# Patient Record
Sex: MALE | Race: WHITE | NOT HISPANIC OR LATINO | Employment: FULL TIME | URBAN - METROPOLITAN AREA
[De-identification: names, ages, dates, MRNs, and addresses within clinical notes are randomized per-mention and may not be internally consistent; named-entity substitution may affect disease eponyms.]

---

## 2019-07-10 ENCOUNTER — HOSPITAL ENCOUNTER (EMERGENCY)
Facility: HOSPITAL | Age: 29
Discharge: HOME | End: 2019-07-10
Attending: EMERGENCY MEDICINE | Admitting: EMERGENCY MEDICINE
Payer: COMMERCIAL

## 2019-07-10 VITALS
HEIGHT: 72 IN | WEIGHT: 200 LBS | SYSTOLIC BLOOD PRESSURE: 157 MMHG | RESPIRATION RATE: 18 BRPM | DIASTOLIC BLOOD PRESSURE: 81 MMHG | BODY MASS INDEX: 27.09 KG/M2 | HEART RATE: 98 BPM | TEMPERATURE: 98.3 F | OXYGEN SATURATION: 95 %

## 2019-07-10 DIAGNOSIS — M54.50 LOW BACK PAIN: Primary | ICD-10-CM

## 2019-07-10 PROCEDURE — 96374 THER/PROPH/DIAG INJ IV PUSH: CPT

## 2019-07-10 PROCEDURE — 99282 EMERGENCY DEPT VISIT SF MDM: CPT

## 2019-07-10 PROCEDURE — 96372 THER/PROPH/DIAG INJ SC/IM: CPT

## 2019-07-10 PROCEDURE — 99284 EMERGENCY DEPT VISIT MOD MDM: CPT | Performed by: EMERGENCY MEDICINE

## 2019-07-10 RX ORDER — SENNOSIDES 8.6 MG
650 CAPSULE ORAL EVERY 8 HOURS PRN
Qty: 30 TABLET | Refills: 0 | Status: SHIPPED | OUTPATIENT
Start: 2019-07-10 | End: 2020-12-28

## 2019-07-10 RX ORDER — IBUPROFEN 600 MG/1
600 TABLET ORAL EVERY 6 HOURS PRN
Qty: 28 TABLET | Refills: 0 | Status: SHIPPED | OUTPATIENT
Start: 2019-07-10 | End: 2019-07-17

## 2019-07-10 RX ORDER — DIAZEPAM 5 MG/1
5 TABLET ORAL
Qty: 4 TABLET | Refills: 0 | Status: SHIPPED | OUTPATIENT
Start: 2019-07-10 | End: 2020-12-28

## 2019-07-10 RX ORDER — DIAZEPAM 5 MG/ML
5 INJECTION, SOLUTION INTRAMUSCULAR; INTRAVENOUS ONCE
Status: COMPLETED | OUTPATIENT
Start: 2019-07-10 | End: 2019-07-10

## 2019-07-10 RX ORDER — KETOROLAC TROMETHAMINE 30 MG/ML
15 INJECTION, SOLUTION INTRAMUSCULAR; INTRAVENOUS ONCE
Status: COMPLETED | OUTPATIENT
Start: 2019-07-10 | End: 2019-07-10

## 2019-07-10 RX ADMIN — KETOROLAC TROMETHAMINE 15 MG: 30 INJECTION, SOLUTION INTRAMUSCULAR at 09:52

## 2019-07-10 RX ADMIN — Medication 5 MG: at 09:53

## 2019-07-10 NOTE — DISCHARGE INSTRUCTIONS
Return to ER if having worsening pain, lose bowel or bladder control, weakness or feel worse overall

## 2019-07-10 NOTE — ED PROVIDER NOTES
History  Chief Complaint   Patient presents with    Back Pain     pt states has low back pain radiating down L leg       History provided by:  Patient   used: No    Back Pain   Associated symptoms: no abdominal pain, no chest pain, no dysuria, no fever, no headaches and no weakness      Patient is a 72-year-old male presenting to emergency department lower back pain  Left-sided  Radiates to the leg  Has had this in the past   Used to see a chiropractor  Was doing better  Since over the weekend has been getting worse  No weakness  Has not lost bowel or bladder control  No paresthesias the groin region  No trauma  No falls  Has not had an MRI  Took ibuprofen at home with minimal relief  Pain limiting motion  No weakness  MDM sciatica versus spasm, will give Valium, Toradol, re-evaluate    None       History reviewed  No pertinent past medical history  History reviewed  No pertinent surgical history  History reviewed  No pertinent family history  I have reviewed and agree with the history as documented  Social History     Tobacco Use    Smoking status: Current Every Day Smoker    Smokeless tobacco: Never Used   Substance Use Topics    Alcohol use: Yes    Drug use: Yes     Types: Marijuana        Review of Systems   Constitutional: Negative for chills, diaphoresis and fever  Respiratory: Negative for cough, shortness of breath, wheezing and stridor  Cardiovascular: Negative for chest pain, palpitations and leg swelling  Gastrointestinal: Negative for abdominal pain, blood in stool, diarrhea, nausea and vomiting  Genitourinary: Negative for dysuria, frequency and urgency  Musculoskeletal: Positive for back pain  Negative for neck stiffness  Skin: Negative for pallor and rash  Neurological: Negative for dizziness, syncope, weakness, light-headedness and headaches  All other systems reviewed and are negative        Physical Exam  Physical Exam Constitutional: He is oriented to person, place, and time  He appears well-developed and well-nourished  HENT:   Head: Normocephalic and atraumatic  Eyes: Pupils are equal, round, and reactive to light  Neck: Neck supple  Cardiovascular: Normal rate, regular rhythm, normal heart sounds and intact distal pulses  Pulmonary/Chest: Effort normal and breath sounds normal  No respiratory distress  Abdominal: Soft  Bowel sounds are normal  There is no tenderness  Musculoskeletal: Normal range of motion  He exhibits no edema, tenderness or deformity  Neurological: He is alert and oriented to person, place, and time  Skin: Skin is warm and dry  Capillary refill takes less than 2 seconds  No rash noted  No erythema  No pallor  Vitals reviewed  Vital Signs  ED Triage Vitals [07/10/19 0835]   Temperature Pulse Respirations Blood Pressure SpO2   98 3 °F (36 8 °C) 98 18 157/81 95 %      Temp Source Heart Rate Source Patient Position - Orthostatic VS BP Location FiO2 (%)   Oral -- -- -- --      Pain Score       Worst Possible Pain           Vitals:    07/10/19 0835   BP: 157/81   Pulse: 98         Visual Acuity      ED Medications  Medications   ketorolac (TORADOL) injection 15 mg (15 mg Intramuscular Given 7/10/19 0227)   diazepam (VALIUM) injection 5 mg (5 mg Intravenous Given 7/10/19 8806)       Diagnostic Studies  Results Reviewed     None                 No orders to display              Procedures  Procedures       ED Course                               MDM    Disposition  Final diagnoses:   Low back pain     Time reflects when diagnosis was documented in both MDM as applicable and the Disposition within this note     Time User Action Codes Description Comment    7/10/2019 10:26 AM Jo Ann Mckenna [M54 5] Low back pain       ED Disposition     ED Disposition Condition Date/Time Comment    Discharge Stable Wed Jul 10, 2019 10:26 AM Jamal Montano discharge to home/self care  Follow-up Information     Follow up With Specialties Details Why Contact Info Additional Information    Army Celi MD Internal Medicine In 2 days Re-evaluation [address] Emergency Department Emergency Medicine  As needed, If symptoms worsen [address]  [phone] [facility], [address], [place], [place], [address]          Discharge Medication List as of 7/10/2019 10:31 AM      START taking these medications    Details   acetaminophen (TYLENOL) 650 mg CR tablet Take 1 tablet (650 mg total) by mouth every 8 (eight) hours as needed for mild pain, Starting Wed 7/10/2019, Print      diazepam (VALIUM) 5 mg tablet Take 1 tablet (5 mg total) by mouth daily at bedtime as needed for muscle spasms for up to 4 days, Starting Wed 7/10/2019, Until Sun 7/14/2019, Print      ibuprofen (MOTRIN) 600 mg tablet Take 1 tablet (600 mg total) by mouth every 6 (six) hours as needed for mild pain for up to 7 days, Starting Wed 7/10/2019, Until Wed 7/17/2019, Print               ED Provider  Electronically Signed by           Ruben Reaves MD  07/10/19 9625

## 2019-07-11 ENCOUNTER — TRANSCRIBE ORDERS (OUTPATIENT)
Dept: PHYSICAL THERAPY | Facility: CLINIC | Age: 29
End: 2019-07-11

## 2019-07-11 ENCOUNTER — AMB VIDEO VISIT (OUTPATIENT)
Dept: URGENT CARE | Facility: CLINIC | Age: 29
End: 2019-07-11

## 2019-07-11 ENCOUNTER — EVALUATION (OUTPATIENT)
Dept: PHYSICAL THERAPY | Facility: CLINIC | Age: 29
End: 2019-07-11
Payer: COMMERCIAL

## 2019-07-11 ENCOUNTER — NURSE TRIAGE (OUTPATIENT)
Dept: PHYSICAL THERAPY | Facility: OTHER | Age: 29
End: 2019-07-11

## 2019-07-11 VITALS — TEMPERATURE: 98.2 F | SYSTOLIC BLOOD PRESSURE: 145 MMHG | DIASTOLIC BLOOD PRESSURE: 80 MMHG | HEART RATE: 88 BPM

## 2019-07-11 DIAGNOSIS — M54.42 ACUTE LEFT-SIDED LOW BACK PAIN WITH LEFT-SIDED SCIATICA: Primary | ICD-10-CM

## 2019-07-11 DIAGNOSIS — M54.42 ACUTE LEFT-SIDED LOW BACK PAIN WITH LEFT-SIDED SCIATICA: ICD-10-CM

## 2019-07-11 PROCEDURE — EVISIT: Performed by: PHYSICIAN ASSISTANT

## 2019-07-11 PROCEDURE — 97162 PT EVAL MOD COMPLEX 30 MIN: CPT | Performed by: PHYSICAL THERAPIST

## 2019-07-11 RX ORDER — METHYLPREDNISOLONE 4 MG/1
TABLET ORAL
Qty: 21 TABLET | Refills: 0 | Status: SHIPPED | OUTPATIENT
Start: 2019-07-11 | End: 2019-07-17

## 2019-07-11 NOTE — TELEPHONE ENCOUNTER
Background - Initial Assessment  Clinical complaint: Acute low back left side, radiates down the left leg  No known injury  Pt has seen a Chiropractor  Pt has had this pain for the past 2 months, did have relief for about 2-3 weeks and pain returned  Pt is taking Valium, Ibuprofen, Tylenol, and is using ice  Date of onset: 2 months  Mechanism of injury:  See above    Previous Treatment - Previous Treatment  Previous evaluation: Chiropractor  Current provider: PCP  Diagnostics: x-rays  Previous treatment: Physical therapy for his leg  Medications, Ice    Protocols used:  AMB COMPREHENSIVE SPINE PROGRAM PROTOCOL    This nurse did review in detail the comp spine program and what we can provide for the pt for their back pain  Pt is agreeable to being triaged by this nurse and would like to have physical therapy  Referrals were placed to the following:  Physical Therapy at the [place] site  Pt was transferred to  to make evaluation appointment    No further questions voiced at this time and Pt did state understanding of the referral that was placed

## 2019-07-11 NOTE — PROGRESS NOTES
PT Evaluation     Today's date: 2019  Patient name: Maurizio Loera  : 1990  MRN: [id number]  Referring provider: Leana Ramirez MD  Dx:   Encounter Diagnosis     ICD-10-CM    1  Acute left-sided low back pain with left-sided sciatica M54 42 Ambulatory referral to PT spine       Start Time: 1355  Stop Time: 1520  Total time in clinic (min): 85 minutes    Assessment  Assessment details: Patient is a 34 y o  male who presents to [address] with the above listed impairments  Patients fits into a specific exercise treatment category due to probable lumbar derangement and would benefit from skilled PT services to address these impairments and to maximize function  The patient with extremely high irritability and an inability to sit or complete a supine position due to the pain  Patient with signs and symptoms of high inflammation along with a right lateral shift  Tele-med conferencing was used during the visit due to the patient's extreme irritability, high pain, and inability to complete any functional tasks  During the tele-med conference, the PA prescribed the patient a medrol dose pack to help with his pain and inflammation  The patient should benefit from shift correction and lumbar extension once his pain is under control  Impairments: abnormal muscle firing, abnormal or restricted ROM, activity intolerance, impaired physical strength, lacks appropriate home exercise program, pain with function, poor posture  and poor body mechanics  Understanding of Dx/Px/POC: good   Prognosis: good    Goals  Impairment Goals  - Decrease pain by 50% in 4 weeks    - Increase lumbar flexion to full in 4 weeks  - Increase lumbar extension to full in 4 weeks    Functional Goals  - Return to Prior Level of Function in 4 weeks  - Patient will be independent with HEP in 4 weeks  - Patient will ambulate without an AD and without compensations in 4 weeks      Plan  Patient would benefit from: skilled physical therapy  Planned modality interventions: cryotherapy, thermotherapy: hydrocollator packs and TENS  Planned therapy interventions: home exercise program, graded exercise, functional ROM exercises, flexibility, body mechanics training, postural training, patient education, therapeutic activities, therapeutic exercise, manual therapy, joint mobilization and neuromuscular re-education  Frequency: 2x week  Duration in weeks: 4  Treatment plan discussed with: patient and PCP        Subjective Evaluation    History of Present Illness  Mechanism of injury: HPI:  Back pain started 2 months ago and was seeing a chiropractor  He noted his pain started to go away and he was able to go away on vacation  He then felt increased pain on  and had to leave work early  He went to the ER 7/10 and was prescribed Tylenol, ibuprofen, and Valium  Patient reports seeing his PCP where x-rays were done on his back and were (-) for any bony pathology  Pain Location:  Left sided low back pain, radiating into the L foot  Occupation:  [employer]   Prior Functional Limitations: Independent prior   AGG:  Walking, bending, sitting, sleeping  Ease:  No easing factors  Patient Goals:  "I want to be able to walk and get back to work"    Pain  Current pain ratin  At best pain ratin  At worst pain rating: 10  Quality: needle-like and dull ache          Objective     Concurrent Complaints  Positive for night pain and disturbed sleep   Negative for bladder dysfunction, bowel dysfunction and saddle (S4) numbness    Additional Special Questions  Unexplained Weight Loss:  NO  Fever:  NO  Urine Retention:  NO  Acute Drop Foot or Paralysis:  NO  Major Trauma (fall from height, high speed collision, direct blow to spine):  NO              If yes; does patient have nausea, lightheadedness, or loss of                            consciousness:        Tenderness     Additional Tenderness Details  No tenderness to palpation    Neurological Testing     Sensation     Lumbar   Left   Intact: light touch  Hyposensation: light touch    Right   Intact: light touch    Reflexes   Left   Patellar (L4): normal (2+)  Achilles (S1): normal (2+)    Right   Patellar (L4): normal (2+)  Achilles (S1): normal (2+)    Additional Neurological Details  Patient unable to tolerate any sitting or supine position  Barely able to sit for reflexes  Patient able to lie prone for 20 minutes    Active Range of Motion     Additional Active Range of Motion Details  Lumbar Extension 25% w/ pain  Lumbar Flexion 0% w/ pain    Strength/Myotome Testing     Additional Strength Details  Unable to fully assess MMTs due to patient's inability to tolerate prone, side lying, or supine  Tests     Additional Tests Details  Patient unable to tolerate any supine positioning, leading to inability to complete special testing  Patient with a right lateral shift evident upon standing  Patient standing and ambulating with bilateral axillary crutches to help un-weight the left lower extremity  Patient with increased pain duirng shift correction  Patient reported 10/10 pain during session  Tele-med was used due to patient's high irritability, with the thought of a medrol dose back to help with inflammation which was prescribed by the PA  General Comments:      Lumbar Comments  Vitals: please see vitals section  BP sitting LUE: please see vitals section  HR: please see vitals section  SaO2: please see vitals section

## 2019-07-11 NOTE — CARE ANYWHERE EVISITS
Visit Summary for Alexandre Reno - Gender: Male - Date of Birth: [id number]  Date: [id number] - Duration: 6 minutes  Patient: Alexandre Reno  Provider: Enrique Car PA-C    Patient Contact Information  Address  [place]; [address] [place]  [phone]    Visit Topics  Back pain [Added By: Self - 2019-07-11]    Conversation Transcripts  [0A][0A] [Notification] Mary Ann Garcia,  Practice Adm, will help you prepare for your visit  She is assisting Enrique Car PA-C, Urgent Care Specialist [0A][Leticia Grover] tHANK YOU FOR CHOOSING eVISITS[0A][Leticia Grover] tHE PROVIDER WILL BE WITH YOU   Naya Moss Thank you[0A][Notification] Mary Ann Garcia has left the room  [0A][WANG COYNE] Reid Salazar this is Payton Holder a PT for [facility]  I have Ashleyberg here through the comprehensive spine program and we just wanted to   make sure we were still connected[0A][Notification] Mary Ann Garcia,  Practice Adm, will help you prepare for your visit  She is assisting Enrique Car PA-C, Urgent Care Specialist [0A][Leticia Grover] Yes you are, I just sent an email to the   provider[0A][Leticia Smith for the wait today  [0A][Leticia Grover] If I transfer to someone else it may be an additional wait, so I am waiting to hear from the [address] [place] [address] [0A][WANG COYNE] Thanks*[0A][Notification] [facility] has left the room  [0A][Notification] You are connected with Enrique Car PA-C, Urgent Care Specialist [0A][Notification] Alexandre Reno is located in [place]  [0A][Notification] Alexandre Reno has shared health history  Guy Earl  [0A]    Diagnosis    Procedures  Value: [id number] Code: CPT-4 UNLISTED E&M SERVICE    Medications Prescribed    No prescriptions ordered    Electronically signed by: Jessica Barrientos(NPI [id number])

## 2019-07-11 NOTE — PROGRESS NOTES
TeleConsultation - Emily Gallagher 34 y o  male MRN: [id number]     Encounter: [id number]    REQUIRED DOCUMENTATION:     1  This service was provided via Telemedicine  2  Provider located at [facility]  3  TeleMed provider: Ilsa Kelly PA-C   4  Identify all parties in room with patient during tele consult:  Patient and PT, George Mcclelland  5  After connecting through televideo, patient was identified by name and date of birth and confirmed patient was located in [place]  Patient was then informed that this was a Telemedicine visit and that the exam was being conducted confidentially over secure lines  My office door was closed  No one else was in the room  Patient acknowledged consent and understanding of privacy and security of the Telemedicine visit  Patient presented the opportunity for them to ask any questions regarding the visit today  The patient agreed to participate  [address]  (office) [phone]  (fax) [phone]        NAME: Emily Gallagher is a 34 y o  male  : 1990    MRN: [id number]  DATE: 2019  TIME: 3:27 PM    Assessment and Plan   Acute left-sided low back pain with left-sided sciatica [M54 42]  1  Acute left-sided low back pain with left-sided sciatica  methylprednisolone (MEDROL) 4 mg tablet       Patient Instructions   Medrol pack as instructed  To follow up with comprehensive pain specialist and PT  To present to the ER if symptoms worsen  Chief Complaint     Chief Complaint   Patient presents with    Back Pain         History of Present Illness   Emily Gallagher presents to the video visit c/o    Back Pain   This is a recurrent problem  The current episode started more than 1 month ago  The problem occurs constantly (worsening over past week)  The problem has been gradually worsening since onset  The pain is present in the lumbar spine (L)  The quality of the pain is described as aching and cramping  Radiates to: posterior left leg  The pain is moderate  The symptoms are aggravated by bending, sitting and position  Associated symptoms include leg pain, numbness and tingling  Pertinent negatives include no abdominal pain, bladder incontinence, bowel incontinence, chest pain, dysuria, fever, headaches, paresis, paresthesias, pelvic pain or perianal numbness  He has tried NSAIDs and muscle relaxant for the symptoms  The treatment provided no relief  Review of Systems   Review of Systems   Constitutional: Negative for fever  Cardiovascular: Negative for chest pain  Gastrointestinal: Negative for abdominal pain and bowel incontinence  Genitourinary: Negative for bladder incontinence, dysuria and pelvic pain  Musculoskeletal: Positive for back pain  Neurological: Positive for tingling and numbness  Negative for headaches and paresthesias  Current Medications     Long-Term Medications   Medication Sig Dispense Refill    diazepam (VALIUM) 5 mg tablet Take 1 tablet (5 mg total) by mouth daily at bedtime as needed for muscle spasms for up to 4 days 4 tablet 0    ibuprofen (MOTRIN) 600 mg tablet Take 1 tablet (600 mg total) by mouth every 6 (six) hours as needed for mild pain for up to 7 days 28 tablet 0       Current Allergies     Allergies as of 07/11/2019    (No Known Allergies)            The following portions of the patient's history were reviewed and updated as appropriate: allergies, current medications, past family history, past medical history, past social history, past surgical history and problem list   History reviewed  No pertinent past medical history  History reviewed  No pertinent surgical history    Social History     Socioeconomic History    Marital status: Single     Spouse name: Not on file    Number of children: Not on file    Years of education: Not on file    Highest education level: Not on file   Occupational History    Not on file   Social Needs    Financial resource strain: Not on file    Food insecurity:     Worry: Not on file     Inability: Not on file    Transportation needs:     Medical: Not on file     Non-medical: Not on file   Tobacco Use    Smoking status: Current Every Day Smoker    Smokeless tobacco: Never Used   Substance and Sexual Activity    Alcohol use:  Yes    Drug use: Yes     Types: Marijuana    Sexual activity: Not on file   Lifestyle    Physical activity:     Days per week: Not on file     Minutes per session: Not on file    Stress: Not on file   Relationships    Social connections:     Talks on phone: Not on file     Gets together: Not on file     Attends Zoroastrianism service: Not on file     Active member of club or organization: Not on file     Attends meetings of clubs or organizations: Not on file     Relationship status: Not on file    Intimate partner violence:     Fear of current or ex partner: Not on file     Emotionally abused: Not on file     Physically abused: Not on file     Forced sexual activity: Not on file   Other Topics Concern    Not on file   Social History Narrative    Not on file       Objective   There were no vitals taken for this visit    video visit  Physical Exam     Physical Exam  Video visit- per PTOrlin, patient standing due to pain, using crutches, lumbar spine tenderness L, unable to complete SLR test due to severity of pain per Mellissa Multani PA-C

## 2019-07-11 NOTE — LETTER
2019    MD Angelita Bell Cha [address] [facility]    Patient: Kaley Rader  YOB: 1990   Date of Visit: 2019      Dear Dr Ovidio Dominguez: One of your patients, Kaley Rader, was referred to me by the [facility] program   Please see the evaluation summary attached below  If care is required beyond 30 days to help your patient reach their goals, I will send you a request for signature on the plan of care  If you have any questions or concerns, please don't hesitate to call  Sincerely,    Donald Collins PT      Primary Care Provider:      I certify that I have read the below Plan of Care and certify the need for these services furnished under this plan of treatment while under my care  MD Angelita Bell Cha [address]: [phone]           PT Evaluation     Today's date: 2019  Patient name: Kaley Rader  : 1990  MRN: [id number]  Referring provider: Harry Merlos MD  Dx:   Encounter Diagnosis     ICD-10-CM    1  Acute left-sided low back pain with left-sided sciatica M54 42 Ambulatory referral to PT spine       Start Time: 1355  Stop Time: 1520  Total time in clinic (min): 85 minutes    Assessment  Assessment details: Patient is a 34 y o  male who presents to [place] [address] program with the above listed impairments  Patients fits into a specific exercise treatment category due to probable lumbar derangement and would benefit from skilled PT services to address these impairments and to maximize function  The patient with extremely high irritability and an inability to sit or complete a supine position due to the pain  Patient with signs and symptoms of high inflammation along with a right lateral shift  Tele-med conferencing was used during the visit due to the patient's extreme irritability, high pain, and inability to complete any functional tasks  During the tele-med conference, the PA prescribed the patient a medrol dose pack to help with his pain and inflammation  The patient should benefit from shift correction and lumbar extension once his pain is under control  Impairments: abnormal muscle firing, abnormal or restricted ROM, activity intolerance, impaired physical strength, lacks appropriate home exercise program, pain with function, poor posture  and poor body mechanics  Understanding of Dx/Px/POC: good   Prognosis: good    Goals  Impairment Goals  - Decrease pain by 50% in 4 weeks  - Increase lumbar flexion to full in 4 weeks  - Increase lumbar extension to full in 4 weeks    Functional Goals  - Return to Prior Level of Function in 4 weeks  - Patient will be independent with HEP in 4 weeks  - Patient will ambulate without an AD and without compensations in 4 weeks      Plan  Patient would benefit from: skilled physical therapy  Planned modality interventions: cryotherapy, thermotherapy: hydrocollator packs and TENS  Planned therapy interventions: home exercise program, graded exercise, functional ROM exercises, flexibility, body mechanics training, postural training, patient education, therapeutic activities, therapeutic exercise, manual therapy, joint mobilization and neuromuscular re-education  Frequency: 2x week  Duration in weeks: 4  Treatment plan discussed with: patient and PCP        Subjective Evaluation    History of Present Illness  Mechanism of injury: HPI:  Back pain started 2 months ago and was seeing a chiropractor  He noted his pain started to go away and he was able to go away on vacation  He then felt increased pain on Tuesday 7/9 and had to leave work early  He went to the ER 7/10 and was prescribed Tylenol, ibuprofen, and Valium  Patient reports seeing his PCP where x-rays were done on his back and were (-) for any bony pathology          Pain Location:  Left sided low back pain, radiating into the L foot  Occupation: [employer]   Prior Functional Limitations: Independent prior   AGG:  Walking, bending, sitting, sleeping  Ease:  No easing factors  Patient Goals:  "I want to be able to walk and get back to work"    Pain  Current pain ratin  At best pain ratin  At worst pain rating: 10  Quality: needle-like and dull ache          Objective     Concurrent Complaints  Positive for night pain and disturbed sleep  Negative for bladder dysfunction, bowel dysfunction and saddle (S4) numbness    Additional Special Questions  Unexplained Weight Loss:  NO  Fever:  NO  Urine Retention:  NO  Acute Drop Foot or Paralysis:  NO  Major Trauma (fall from height, high speed collision, direct blow to spine):  NO              If yes; does patient have nausea, lightheadedness, or loss of                            consciousness:        Tenderness     Additional Tenderness Details  No tenderness to palpation    Neurological Testing     Sensation     Lumbar   Left   Intact: light touch  Hyposensation: light touch    Right   Intact: light touch    Reflexes   Left   Patellar (L4): normal (2+)  Achilles (S1): normal (2+)    Right   Patellar (L4): normal (2+)  Achilles (S1): normal (2+)    Additional Neurological Details  Patient unable to tolerate any sitting or supine position  Barely able to sit for reflexes  Patient able to lie prone for 20 minutes    Active Range of Motion     Additional Active Range of Motion Details  Lumbar Extension 25% w/ pain  Lumbar Flexion 0% w/ pain    Strength/Myotome Testing     Additional Strength Details  Unable to fully assess MMTs due to patient's inability to tolerate prone, side lying, or supine  Tests     Additional Tests Details  Patient unable to tolerate any supine positioning, leading to inability to complete special testing  Patient with a right lateral shift evident upon standing  Patient standing and ambulating with bilateral axillary crutches to help un-weight the left lower extremity    Patient with increased pain duirng shift correction  Patient reported 10/10 pain during session  Tele-med was used due to patient's high irritability, with the thought of a medrol dose back to help with inflammation which was prescribed by the PA  General Comments:      Lumbar Comments  Vitals: please see vitals section  BP sitting LUE: please see vitals section  HR: please see vitals section  SaO2: please see vitals section

## 2019-07-15 ENCOUNTER — OFFICE VISIT (OUTPATIENT)
Dept: PHYSICAL THERAPY | Facility: CLINIC | Age: 29
End: 2019-07-15
Payer: COMMERCIAL

## 2019-07-15 DIAGNOSIS — M54.42 ACUTE LEFT-SIDED LOW BACK PAIN WITH LEFT-SIDED SCIATICA: Primary | ICD-10-CM

## 2019-07-15 PROCEDURE — 97140 MANUAL THERAPY 1/> REGIONS: CPT | Performed by: PHYSICAL THERAPIST

## 2019-07-15 NOTE — PROGRESS NOTES
Daily Note     Today's date: 7/15/2019  Patient name: Marcellus Mullins  : 1990  MRN: [id number]  Referring provider: Jerri Payan MD  Dx:   Encounter Diagnosis     ICD-10-CM    1  Acute left-sided low back pain with left-sided sciatica M54 42        Start Time: 1105  Stop Time: 1130  Total time in clinic (min): 25 minutes    Subjective:  "I still feel exactly the same  My chiropractor ordered an MRI and it says I have an herniation  My pain is 10/10  I need to get relief"      Objective: See treatment diary below      Assessment: Tolerated treatment poor  Patient demonstrated fatigue post treatment  Patient with no change in his symptoms  On day 4 of his medrol dose pack with no relief  Several attempts at shift correction with no relief  Patient unable to maintain prone position  Great toe weakness evident today  Due to these symptoms and with no reduction in pain to this point, the patient will be referred to Dr Yulia Mcelroy  Plan: Continue per plan of care        Diagnosis:    Precautions:    Manuals 7/15       PROM        Mobs Shift correction for right lateral shift - unable to centralize symptoms                       Exercise Diary        Prone Lying Prone 30", increased pain unable to tolerate position, no centralization of symptoms        Prone on elbows                                                                                                                                                                Modalities             CP PRN

## 2019-07-17 ENCOUNTER — CONSULT (OUTPATIENT)
Dept: PAIN MEDICINE | Facility: CLINIC | Age: 29
End: 2019-07-17
Payer: COMMERCIAL

## 2019-07-17 ENCOUNTER — TRANSCRIBE ORDERS (OUTPATIENT)
Dept: PAIN MEDICINE | Facility: CLINIC | Age: 29
End: 2019-07-17

## 2019-07-17 ENCOUNTER — APPOINTMENT (OUTPATIENT)
Dept: PHYSICAL THERAPY | Facility: CLINIC | Age: 29
End: 2019-07-17
Payer: COMMERCIAL

## 2019-07-17 VITALS — HEART RATE: 87 BPM | DIASTOLIC BLOOD PRESSURE: 88 MMHG | SYSTOLIC BLOOD PRESSURE: 142 MMHG | TEMPERATURE: 97.9 F

## 2019-07-17 DIAGNOSIS — M51.16 HERNIATION OF LUMBAR INTERVERTEBRAL DISC WITH RADICULOPATHY: ICD-10-CM

## 2019-07-17 PROCEDURE — 99244 OFF/OP CNSLTJ NEW/EST MOD 40: CPT | Performed by: ANESTHESIOLOGY

## 2019-07-17 RX ORDER — GABAPENTIN 300 MG/1
CAPSULE ORAL
Qty: 90 CAPSULE | Refills: 0 | Status: SHIPPED | OUTPATIENT
Start: 2019-07-17 | End: 2020-12-28

## 2019-07-17 RX ORDER — DICLOFENAC SODIUM 75 MG/1
75 TABLET, DELAYED RELEASE ORAL 2 TIMES DAILY
Qty: 60 TABLET | Refills: 0 | Status: SHIPPED | OUTPATIENT
Start: 2019-07-17 | End: 2020-12-28

## 2019-07-17 NOTE — PROGRESS NOTES
Assessment  1  Herniation of lumbar intervertebral disc with radiculopathy        Plan  The patient's symptoms, history/physical are consistent with pain that is multifactorial in origin but predominantly the result of the L4-5 disc herniation which is leading to a left-sided radiculopathy  At this time, I discussed treatment that will be multimodal in approach  He will be scheduled for a left L4-5 transforaminal epidural steroid injection to help reduce swelling and inflammation which is leading to the pain symptoms  He was apprised of the most common risks and would like to proceed  He will be scheduled for an upcoming Tuesday or Thursday under fluoroscopic guidance  I will start him on gabapentin 300 mg at bedtime which he will titrate up to t i d  dosing  He was apprised of the most common side effects including sleepiness and dizziness  In addition, I will have him discontinue ibuprofen and start him on diclofenac 75 mg twice daily for inflammation  Complete risks and benefits including bleeding, infection, tissue reaction, nerve injury and allergic reaction were discussed  The approach was demonstrated using models and literature was provided  Verbal and written consent was obtained  My impressions and treatment recommendations were discussed in detail with the patient who verbalized understanding and had no further questions  Discharge instructions were provided  I personally saw and examined the patient and I agree with the above discussed plan of care  Orders Placed This Encounter   Procedures    FL spine and pain procedure     Standing Status:   Future     Standing Expiration Date:   7/17/2023     Order Specific Question:   Reason for Exam:     Answer:   Left L4-5 TF ROD     Order Specific Question:   Anticoagulant hold needed?      Answer:   No     New Medications Ordered This Visit   Medications    gabapentin (NEURONTIN) 300 mg capsule     Sig: Take 1 tablet at bedtime for 3 days, then 1 tablet twice daily for 3 days, then 1 tablet 3 times daily     Dispense:  90 capsule     Refill:  0    diclofenac (VOLTAREN) 75 mg EC tablet     Sig: Take 1 tablet (75 mg total) by mouth 2 (two) times a day     Dispense:  60 tablet     Refill:  0       History of Present Illness    Jeffery Burton is a 34 y o  male who presents for consultation in regards to lower back and left-sided leg pain  Symptoms have been present for 2 months without any precipitating injury or trauma  Pain symptoms are severe rated 10/10 on numeric rating scale and felt constantly  Pain is described to be burning, pressure-like with numbness and paresthesias that radiates down the left leg posteriorly  Symptoms are aggravated with lying down, standing, bending, sitting, walking, exercise, relaxation, coughing, sneezing, bowel movements  Treatment history has included prednisone pack and Valium which has provided minimal relief  He is also on ibuprofen and Tylenol which is providing minimal relief  Physical therapy, heat/ice and chiropractic manipulation of provided no relief  I have personally reviewed and/or updated the patient's past medical history, past surgical history, family history, social history, current medications, allergies, and vital signs today  Review of Systems   Constitutional: Negative for fever and unexpected weight change  HENT: Negative for trouble swallowing  Eyes: Negative for visual disturbance  Respiratory: Negative for shortness of breath and wheezing  Cardiovascular: Negative for chest pain and palpitations  Gastrointestinal: Negative for constipation, diarrhea, nausea and vomiting  Endocrine: Negative for cold intolerance, heat intolerance and polydipsia  Genitourinary: Negative for difficulty urinating and frequency  Musculoskeletal: Negative for arthralgias, gait problem, joint swelling and myalgias  Skin: Positive for rash  Neurological: Positive for numbness  Negative for dizziness, seizures, syncope, weakness and headaches  Hematological: Does not bruise/bleed easily  Psychiatric/Behavioral: Negative for dysphoric mood  All other systems reviewed and are negative  Patient Active Problem List   Diagnosis    Herniation of lumbar intervertebral disc with radiculopathy       No past medical history on file  No past surgical history on file  No family history on file  Social History     Occupational History    Not on file   Tobacco Use    Smoking status: Current Every Day Smoker    Smokeless tobacco: Never Used   Substance and Sexual Activity    Alcohol use: Yes    Drug use: Yes     Types: Marijuana    Sexual activity: Not on file       Current Outpatient Medications on File Prior to Visit   Medication Sig    acetaminophen (TYLENOL) 650 mg CR tablet Take 1 tablet (650 mg total) by mouth every 8 (eight) hours as needed for mild pain    diazepam (VALIUM) 5 mg tablet Take 1 tablet (5 mg total) by mouth daily at bedtime as needed for muscle spasms for up to 4 days    [DISCONTINUED] ibuprofen (MOTRIN) 600 mg tablet Take 1 tablet (600 mg total) by mouth every 6 (six) hours as needed for mild pain for up to 7 days    [DISCONTINUED] methylprednisolone (MEDROL) 4 mg tablet Medrol dosepak, take as directed     No current facility-administered medications on file prior to visit  No Known Allergies    Physical Exam    /88   Pulse 87   Temp 97 9 °F (36 6 °C) (Oral)     Constitutional: normal, well developed, well nourished, alert, in no distress and non-toxic and no overt pain behavior    Eyes: anicteric  HEENT: grossly intact  Neck: supple, symmetric, trachea midline and no masses   Pulmonary:even and unlabored  Cardiovascular:No edema or pitting edema present  Skin:Normal without rashes or lesions and well hydrated  Psychiatric:Mood and affect appropriate  Neurologic:Cranial Nerves II-XII grossly intact  Musculoskeletal:antalgic and Ambulating with crutches     Lumbar Spine Exam  Appearance:  Normal lordosis  Palpation/Tenderness:  left lumbar paraspinal tenderness  left sacroiliac joint tenderness  left piriformis tenderness  Sensory:  no sensory deficits noted  Range of Motion:  Flexion:   Moderately limited  with pain  Extension:  Moderately limited  with pain  Lateral Flexion - Left:  Moderately limited  with pain  Lateral Flexion - Right:  No limitation  without pain  Rotation - Left:  Moderately limited  with pain  Rotation - Right:  No limitation  without pain  Motor Strength:  Left hip flexion:  4/5  Left hip extension:  5/5  Right hip flexion:  5/5  Right hip extension:  5/5  Left knee flexion:  5/5  Left knee extension:  4/5  Right knee flexion:  5/5  Right knee extension:  5/5  Left foot dorsiflexion:  4/5  Left foot plantar flexion:  5/5  Right foot dorsiflexion:  5/5  Right foot plantar flexion:  5/5  Reflexes:  Left Patellar:  1+   Right Patellar:  1+   Left Achilles:  1+   Right Achilles:  1+   Special Tests:  Left Straight Leg Test:  positive  Right Straight Leg Test:  negative    Imaging    MRI Lumbar Spine (7/12/2019)    Large L4-5 disc herniation causing mild stenosis

## 2019-07-19 ENCOUNTER — APPOINTMENT (OUTPATIENT)
Dept: PHYSICAL THERAPY | Facility: CLINIC | Age: 29
End: 2019-07-19
Payer: COMMERCIAL

## 2019-07-22 ENCOUNTER — TELEPHONE (OUTPATIENT)
Dept: PAIN MEDICINE | Facility: MEDICAL CENTER | Age: 29
End: 2019-07-22

## 2019-07-22 NOTE — TELEPHONE ENCOUNTER
I s/w pt who reports swelling of his left foot that is going up the calf  No redness or warmth to touch of the left leg  Pt denies drowsiness or dizziness  Pt said it began 2 days ago  Pt said he started with the TID dosing this morning  He did take 1 dose this AM  I advised pt swelling can be caused by the gabapentin  Pt reports he has not seen any improvement yet with taking the gabapentin  I advised pt not to take any further gabapentin until I d/w FQ and c/b with his rec

## 2019-07-22 NOTE — TELEPHONE ENCOUNTER
Stephanie Maynard pt mother called stating that pt leg is swelling up and she is not sure if this is something she should be concerned about       Pt can be reached at [phone]

## 2019-07-22 NOTE — TELEPHONE ENCOUNTER
Pt informed of the same as stated in FQ's task  Pt told to c/b if the swelling continues to increase

## 2019-07-27 ENCOUNTER — HOSPITAL ENCOUNTER (EMERGENCY)
Facility: HOSPITAL | Age: 29
Discharge: HOME | End: 2019-07-27
Attending: EMERGENCY MEDICINE
Payer: COMMERCIAL

## 2019-07-27 VITALS
WEIGHT: 199.96 LBS | DIASTOLIC BLOOD PRESSURE: 74 MMHG | OXYGEN SATURATION: 99 % | TEMPERATURE: 98 F | SYSTOLIC BLOOD PRESSURE: 137 MMHG | RESPIRATION RATE: 20 BRPM | BODY MASS INDEX: 27.12 KG/M2 | HEART RATE: 96 BPM

## 2019-07-27 DIAGNOSIS — G89.29 ACUTE EXACERBATION OF CHRONIC LOW BACK PAIN: ICD-10-CM

## 2019-07-27 DIAGNOSIS — M51.26 LUMBAR DISC HERNIATION: ICD-10-CM

## 2019-07-27 DIAGNOSIS — G89.29 CHRONIC BACK PAIN: Primary | ICD-10-CM

## 2019-07-27 DIAGNOSIS — M54.9 CHRONIC BACK PAIN: Primary | ICD-10-CM

## 2019-07-27 DIAGNOSIS — M54.50 ACUTE EXACERBATION OF CHRONIC LOW BACK PAIN: ICD-10-CM

## 2019-07-27 PROCEDURE — 99283 EMERGENCY DEPT VISIT LOW MDM: CPT | Performed by: EMERGENCY MEDICINE

## 2019-07-27 PROCEDURE — 99283 EMERGENCY DEPT VISIT LOW MDM: CPT

## 2019-07-27 PROCEDURE — 96372 THER/PROPH/DIAG INJ SC/IM: CPT

## 2019-07-27 RX ORDER — HYDROMORPHONE HCL 110MG/55ML
2 PATIENT CONTROLLED ANALGESIA SYRINGE INTRAVENOUS ONCE
Status: COMPLETED | OUTPATIENT
Start: 2019-07-27 | End: 2019-07-27

## 2019-07-27 RX ADMIN — HYDROMORPHONE HYDROCHLORIDE 2 MG: 2 INJECTION INTRAMUSCULAR; INTRAVENOUS; SUBCUTANEOUS at 21:26

## 2019-07-28 ENCOUNTER — HOSPITAL ENCOUNTER (EMERGENCY)
Facility: HOSPITAL | Age: 29
Discharge: HOME | End: 2019-07-28
Attending: EMERGENCY MEDICINE | Admitting: EMERGENCY MEDICINE
Payer: COMMERCIAL

## 2019-07-28 VITALS
DIASTOLIC BLOOD PRESSURE: 93 MMHG | HEART RATE: 95 BPM | SYSTOLIC BLOOD PRESSURE: 155 MMHG | TEMPERATURE: 97.9 F | RESPIRATION RATE: 18 BRPM | OXYGEN SATURATION: 98 %

## 2019-07-28 DIAGNOSIS — M51.16 HERNIATION OF LUMBAR INTERVERTEBRAL DISC WITH RADICULOPATHY: ICD-10-CM

## 2019-07-28 DIAGNOSIS — M54.50 LOW BACK PAIN: Primary | ICD-10-CM

## 2019-07-28 DIAGNOSIS — M51.26 LUMBAR HERNIATED DISC: ICD-10-CM

## 2019-07-28 PROCEDURE — 99284 EMERGENCY DEPT VISIT MOD MDM: CPT | Performed by: PHYSICIAN ASSISTANT

## 2019-07-28 PROCEDURE — 96372 THER/PROPH/DIAG INJ SC/IM: CPT

## 2019-07-28 PROCEDURE — 99283 EMERGENCY DEPT VISIT LOW MDM: CPT

## 2019-07-28 RX ORDER — KETOROLAC TROMETHAMINE 30 MG/ML
30 INJECTION, SOLUTION INTRAMUSCULAR; INTRAVENOUS ONCE
Status: COMPLETED | OUTPATIENT
Start: 2019-07-28 | End: 2019-07-28

## 2019-07-28 RX ORDER — DIAZEPAM 5 MG/ML
5 INJECTION, SOLUTION INTRAMUSCULAR; INTRAVENOUS ONCE
Status: COMPLETED | OUTPATIENT
Start: 2019-07-28 | End: 2019-07-28

## 2019-07-28 RX ORDER — HYDROCODONE BITARTRATE AND ACETAMINOPHEN 5; 325 MG/1; MG/1
1 TABLET ORAL EVERY 6 HOURS PRN
Qty: 8 TABLET | Refills: 0 | Status: SHIPPED | OUTPATIENT
Start: 2019-07-28 | End: 2020-12-28

## 2019-07-28 RX ORDER — HYDROMORPHONE HCL/PF 1 MG/ML
0.5 SYRINGE (ML) INJECTION ONCE
Status: COMPLETED | OUTPATIENT
Start: 2019-07-28 | End: 2019-07-28

## 2019-07-28 RX ADMIN — KETOROLAC TROMETHAMINE 30 MG: 30 INJECTION, SOLUTION INTRAMUSCULAR at 19:16

## 2019-07-28 RX ADMIN — HYDROMORPHONE HYDROCHLORIDE 0.5 MG: 1 INJECTION, SOLUTION INTRAMUSCULAR; INTRAVENOUS; SUBCUTANEOUS at 19:40

## 2019-07-28 RX ADMIN — DIAZEPAM 5 MG: 5 INJECTION, SOLUTION INTRAMUSCULAR; INTRAVENOUS at 19:16

## 2019-07-28 NOTE — ED PROVIDER NOTES
History  Chief Complaint   Patient presents with    Back Pain     Pt presents to the ED with back pain that has been on going for 3 weeks and is following with the pain and spine specialist and was told to come to the ED for pain management     Patient presents to the emergency department with severe lower back pain with radiation down his left leg which was exacerbated prior to arrival after he bent down to reach for something  Patient is currently being seen by pain management and patient claims he was sent here by pain management for pain management  Patient has a history of known herniated discs and is due to get lumbar injections next week  He denies bowel or bladder incontinence  He denies saddle anesthesia  He has no other issues or pain at this time  Prior to Admission Medications   Prescriptions Last Dose Informant Patient Reported? Taking?   acetaminophen (TYLENOL) 650 mg CR tablet   No No   Sig: Take 1 tablet (650 mg total) by mouth every 8 (eight) hours as needed for mild pain   diazepam (VALIUM) 5 mg tablet   No No   Sig: Take 1 tablet (5 mg total) by mouth daily at bedtime as needed for muscle spasms for up to 4 days   diclofenac (VOLTAREN) 75 mg EC tablet   No No   Sig: Take 1 tablet (75 mg total) by mouth 2 (two) times a day   gabapentin (NEURONTIN) 300 mg capsule   No No   Sig: Take 1 tablet at bedtime for 3 days, then 1 tablet twice daily for 3 days, then 1 tablet 3 times daily      Facility-Administered Medications: None       History reviewed  No pertinent past medical history  History reviewed  No pertinent surgical history  History reviewed  No pertinent family history  I have reviewed and agree with the history as documented  Social History     Tobacco Use    Smoking status: Current Every Day Smoker    Smokeless tobacco: Never Used   Substance Use Topics    Alcohol use: Yes    Drug use: Yes     Types: Marijuana        Review of Systems   Constitutional: Negative  Negative for activity change, appetite change, chills, diaphoresis, fatigue and fever  HENT: Negative  Negative for congestion, drooling, rhinorrhea, trouble swallowing and voice change  Eyes: Negative  Negative for photophobia and visual disturbance  Respiratory: Negative  Negative for cough, choking, chest tightness, shortness of breath, wheezing and stridor  Cardiovascular: Negative  Negative for chest pain, palpitations and leg swelling  Gastrointestinal: Negative  Negative for diarrhea, nausea and vomiting  Endocrine: Negative  Genitourinary: Negative  Negative for dysuria, flank pain, hematuria and urgency  Musculoskeletal: Positive for back pain  Negative for arthralgias, gait problem, joint swelling, myalgias, neck pain and neck stiffness  Skin: Negative  Negative for rash and wound  Allergic/Immunologic: Negative  Neurological: Negative  Negative for dizziness, tremors, seizures, syncope, speech difficulty, weakness, numbness and headaches  Hematological: Negative  Does not bruise/bleed easily  Psychiatric/Behavioral: Negative  Negative for confusion  Physical Exam  Physical Exam   Constitutional: He is oriented to person, place, and time  He appears well-developed and well-nourished  No distress  Nontoxic appearance without respiratory distress  Patient visibly uncomfortable  HENT:   Head: Normocephalic and atraumatic  Eyes: Pupils are equal, round, and reactive to light  EOM are normal    Neck: Normal range of motion  Neck supple  Cardiovascular: Normal rate, regular rhythm, normal heart sounds and intact distal pulses  Pulmonary/Chest: Effort normal and breath sounds normal  No stridor  No respiratory distress  He has no wheezes  He has no rales  He exhibits no tenderness  Abdominal: Soft  Bowel sounds are normal  He exhibits no distension and no mass  There is no tenderness  There is no rebound and no guarding  No hernia     Musculoskeletal: He exhibits tenderness  Normal appearing lumbar region  No asymmetry  No swelling  No ecchymosis or erythema  No midline tenderness  Mild reproducible tenderness to palpation  Significantly decreased range of motion secondary to pain  Neurological: He is alert and oriented to person, place, and time  He has normal reflexes  He displays normal reflexes  No cranial nerve deficit or sensory deficit  He exhibits normal muscle tone  Coordination normal    Skin: Skin is warm and dry  No rash noted  He is not diaphoretic  Psychiatric: He has a normal mood and affect  His behavior is normal  Judgment and thought content normal    Nursing note and vitals reviewed  Vital Signs  ED Triage Vitals [07/27/19 2038]   Temperature Pulse Respirations Blood Pressure SpO2   98 °F (36 7 °C) 96 20 137/74 99 %      Temp Source Heart Rate Source Patient Position - Orthostatic VS BP Location FiO2 (%)   Oral Monitor Lying Right arm --      Pain Score       Worst Possible Pain           Vitals:    07/27/19 2038   BP: 137/74   Pulse: 96   Patient Position - Orthostatic VS: Lying         Visual Acuity      ED Medications  Medications   HYDROmorphone (DILAUDID) injection 2 mg (2 mg Intramuscular Given 7/27/19 2126)       Diagnostic Studies  Results Reviewed     None                 No orders to display              Procedures  Procedures       ED Course  ED Course as of Jul 27 2220   Sat Jul 27, 2019   2218   Patient with significant improvement after analgesics  This comfortable for discharge  Will refer back to pain management  Discussed signs and symptoms requiring return to the emergency department                                    MDM    Disposition  Final diagnoses:   Chronic back pain   Acute exacerbation of chronic low back pain   Lumbar disc herniation     Time reflects when diagnosis was documented in both MDM as applicable and the Disposition within this note     Time User Action Codes Description Comment    7/27/2019 10:19 PM Rush Leaks Add [M54 9,  G89 29] Chronic back pain     7/27/2019 10:19 PM RomanAlicia mcqueen Caul Add [M54 5,  G89 29] Acute exacerbation of chronic low back pain     7/27/2019 10:19 PM Figueroa Leaks Add [M51 26] Lumbar disc herniation       ED Disposition     ED Disposition Condition Date/Time Comment    Discharge Stable Sat Jul 27, 2019 10:19 PM Jeffery Burton discharge to home/self care  Follow-up Information     Follow up With Specialties Details Why Mora Villarreal MD Internal Medicine  also follow up with pain management [place] [id number] [id number] [facility]  [phone]            Patient's Medications   Discharge Prescriptions    No medications on file     No discharge procedures on file      ED Provider  Electronically Signed by           Shelli Ho MD  07/27/19 2844

## 2019-07-28 NOTE — ED PROVIDER NOTES
History  Chief Complaint   Patient presents with    Back Pain     Pt  states has herniated disc and is supposed to get cortisone injection this week, has unbearable back pain at present time  Was seen yesterday for same  This is a 35 y/o M who presents today for back pain  The patient was seen yesterday for similar complaints  He is scheduled for an epidural this week  He follows with pain management  He was told to stop his valium and start gabapentin  He was given dilaudid yesterday for pain which helped resolve the pain  He reports that the pain worsened again today and prompted him to return  He reports no saddle anesthesia or incontinence  History provided by:  Patient      Prior to Admission Medications   Prescriptions Last Dose Informant Patient Reported? Taking?   acetaminophen (TYLENOL) 650 mg CR tablet   No No   Sig: Take 1 tablet (650 mg total) by mouth every 8 (eight) hours as needed for mild pain   diazepam (VALIUM) 5 mg tablet   No No   Sig: Take 1 tablet (5 mg total) by mouth daily at bedtime as needed for muscle spasms for up to 4 days   diclofenac (VOLTAREN) 75 mg EC tablet   No No   Sig: Take 1 tablet (75 mg total) by mouth 2 (two) times a day   gabapentin (NEURONTIN) 300 mg capsule   No No   Sig: Take 1 tablet at bedtime for 3 days, then 1 tablet twice daily for 3 days, then 1 tablet 3 times daily      Facility-Administered Medications: None       History reviewed  No pertinent past medical history  Past Surgical History:   Procedure Laterality Date    KNEE ARTHROSCOPY W/ ACL RECONSTRUCTION Right        History reviewed  No pertinent family history  I have reviewed and agree with the history as documented  Social History     Tobacco Use    Smoking status: Current Every Day Smoker    Smokeless tobacco: Never Used   Substance Use Topics    Alcohol use: Yes    Drug use: Yes     Types: Marijuana        Review of Systems   Constitutional: Negative  HENT: Negative      Eyes: Negative  Respiratory: Negative  Cardiovascular: Negative  Gastrointestinal: Negative  Endocrine: Negative  Genitourinary: Negative  Musculoskeletal: Positive for arthralgias, back pain and myalgias  Negative for gait problem and joint swelling  Skin: Negative  Allergic/Immunologic: Negative  Neurological: Negative  Hematological: Negative  Psychiatric/Behavioral: Negative  Physical Exam  Physical Exam   Constitutional: He appears well-developed and well-nourished  Cardiovascular: Normal rate, regular rhythm and normal heart sounds  Pulmonary/Chest: Effort normal and breath sounds normal    Musculoskeletal:   Noted significant pain upon palpation of lumbar spine/paraspinal muscles  The patient has no gross strength deficit or sensory deficit of lower extremities  No swelling noted  Skin: Skin is warm  Capillary refill takes less than 2 seconds  Vital Signs  ED Triage Vitals [07/28/19 1851]   Temperature Pulse Respirations Blood Pressure SpO2   97 9 °F (36 6 °C) 95 18 155/93 98 %      Temp Source Heart Rate Source Patient Position - Orthostatic VS BP Location FiO2 (%)   Oral Monitor Standing Right arm --      Pain Score       Worst Possible Pain           Vitals:    07/28/19 1851   BP: 155/93   Pulse: 95   Patient Position - Orthostatic VS: Standing         Visual Acuity      ED Medications  Medications   ketorolac (TORADOL) injection 30 mg (30 mg Intramuscular Given 7/28/19 1916)   diazepam (VALIUM) injection 5 mg (5 mg Intramuscular Given 7/28/19 1916)   HYDROmorphone (DILAUDID) injection 0 5 mg (0 5 mg Intramuscular Given 7/28/19 1940)       Diagnostic Studies  Results Reviewed     None                 No orders to display              Procedures  Procedures       ED Course  ED Course as of Jul 28 2254   [place] Jul 28, 2019   1933 Patient remains in significant pain despite treatments  Will give dilaudid as this helped previously         1947 I recommended the patient call pain management tomorrow morning given his significant pain      2009 Spoke to Dr Xenia Geiger, pain management - who recommended vicodin for 2 days  MDM  Number of Diagnoses or Management Options  Herniation of lumbar intervertebral disc with radiculopathy:   Low back pain:   Lumbar herniated disc:   Diagnosis management comments: This is a 35 y/o M who presents today is obvious gross pain  I attempted to give him valium and toradol  The patient had minimal to no symptom relief  I gave him 0 5mg of dilaudid and the pain improved  I called pain management - Dr Junior [facility] who recommended the patient have vicodin to go home with for 2 days and follow for his epidural in the office  The patient was d/c home stable with noted pain improvement  Disposition  Final diagnoses:   Low back pain   Lumbar herniated disc   Herniation of lumbar intervertebral disc with radiculopathy     Time reflects when diagnosis was documented in both MDM as applicable and the Disposition within this note     Time User Action Codes Description Comment    7/28/2019  8:18 PM Mansoor FORD Add [M54 5] Low back pain     7/28/2019  8:18 PM Owen Perez Add [M51 26] Lumbar herniated disc     7/28/2019  8:19 PM Owen Perez Add [M51 16] Herniation of lumbar intervertebral disc with radiculopathy       ED Disposition     ED Disposition Condition Date/Time Comment    Discharge Stable Sun Jul 28, 2019  8:13 PM Vishal Lindquist discharge to home/self care              Follow-up Information    None         Discharge Medication List as of 7/28/2019  8:19 PM      START taking these medications    Details   HYDROcodone-acetaminophen (NORCO) 5-325 mg per tablet Take 1 tablet by mouth every 6 (six) hours as needed for pain for up to 8 dosesMax Daily Amount: 4 tablets, Starting Sun 7/28/2019, Print         CONTINUE these medications which have NOT CHANGED    Details   acetaminophen (TYLENOL) 650 mg CR tablet Take 1 tablet (650 mg total) by mouth every 8 (eight) hours as needed for mild pain, Starting Wed 7/10/2019, Print      diazepam (VALIUM) 5 mg tablet Take 1 tablet (5 mg total) by mouth daily at bedtime as needed for muscle spasms for up to 4 days, Starting Wed 7/10/2019, Until Sun 7/14/2019, Print      diclofenac (VOLTAREN) 75 mg EC tablet Take 1 tablet (75 mg total) by mouth 2 (two) times a day, Starting Wed 7/17/2019, Normal      gabapentin (NEURONTIN) 300 mg capsule Take 1 tablet at bedtime for 3 days, then 1 tablet twice daily for 3 days, then 1 tablet 3 times daily, Normal           No discharge procedures on file      ED Provider  Electronically Signed by           Madan García PA-C  07/28/19 0504

## 2019-07-30 ENCOUNTER — TELEPHONE (OUTPATIENT)
Dept: PAIN MEDICINE | Facility: CLINIC | Age: 29
End: 2019-07-30

## 2019-07-30 ENCOUNTER — HOSPITAL ENCOUNTER (OUTPATIENT)
Dept: RADIOLOGY | Facility: CLINIC | Age: 29
Discharge: HOME | End: 2019-07-30
Attending: ANESTHESIOLOGY | Admitting: ANESTHESIOLOGY
Payer: COMMERCIAL

## 2019-07-30 VITALS
DIASTOLIC BLOOD PRESSURE: 80 MMHG | RESPIRATION RATE: 20 BRPM | SYSTOLIC BLOOD PRESSURE: 114 MMHG | TEMPERATURE: 98.3 F | HEART RATE: 84 BPM | OXYGEN SATURATION: 96 %

## 2019-07-30 DIAGNOSIS — M51.16 HERNIATION OF LUMBAR INTERVERTEBRAL DISC WITH RADICULOPATHY: ICD-10-CM

## 2019-07-30 PROCEDURE — 64483 NJX AA&/STRD TFRM EPI L/S 1: CPT | Performed by: ANESTHESIOLOGY

## 2019-07-30 PROCEDURE — 64484 NJX AA&/STRD TFRM EPI L/S EA: CPT | Performed by: ANESTHESIOLOGY

## 2019-07-30 RX ORDER — BUPIVACAINE HCL/PF 2.5 MG/ML
10 VIAL (ML) INJECTION ONCE
Status: COMPLETED | OUTPATIENT
Start: 2019-07-30 | End: 2019-07-30

## 2019-07-30 RX ORDER — 0.9 % SODIUM CHLORIDE 0.9 %
10 VIAL (ML) INJECTION ONCE
Status: COMPLETED | OUTPATIENT
Start: 2019-07-30 | End: 2019-07-30

## 2019-07-30 RX ORDER — METHYLPREDNISOLONE ACETATE 80 MG/ML
80 INJECTION, SUSPENSION INTRA-ARTICULAR; INTRALESIONAL; INTRAMUSCULAR; PARENTERAL; SOFT TISSUE ONCE
Status: COMPLETED | OUTPATIENT
Start: 2019-07-30 | End: 2019-07-30

## 2019-07-30 RX ADMIN — BUPIVACAINE HYDROCHLORIDE 2 ML: 2.5 INJECTION, SOLUTION EPIDURAL; INFILTRATION; INTRACAUDAL at 09:04

## 2019-07-30 RX ADMIN — Medication 5 ML: at 09:02

## 2019-07-30 RX ADMIN — SODIUM CHLORIDE 5 ML: 9 INJECTION, SOLUTION INTRAMUSCULAR; INTRAVENOUS; SUBCUTANEOUS at 09:02

## 2019-07-30 RX ADMIN — IOHEXOL 1 ML: 300 INJECTION, SOLUTION INTRAVENOUS at 09:04

## 2019-07-30 RX ADMIN — METHYLPREDNISOLONE ACETATE 80 MG: 80 INJECTION, SUSPENSION INTRA-ARTICULAR; INTRALESIONAL; INTRAMUSCULAR; PARENTERAL; SOFT TISSUE at 09:04

## 2019-07-30 NOTE — DISCHARGE INSTR - LAB
Epidural Steroid Injection   WHAT YOU NEED TO KNOW:   An epidural steroid injection (ROD) is a procedure to inject steroid medicine into the epidural space  The epidural space is between your spinal cord and vertebrae  Steroids reduce inflammation and fluid buildup in your spine that may be causing pain  You may be given pain medicine along with the steroids  ACTIVITY  · Do not drive or operate machinery today  · No strenuous activity today - bending, lifting, etc   · You may resume normal activites starting tomorrow - start slowly and as tolerated  · You may shower today, but no tub baths or hot tubs  · You may have numbness for several hours from the local anesthetic  Please use caution and common sense, especially with weight-bearing activities  CARE OF THE INJECTION SITE  · If you have soreness or pain, apply ice to the area today (20 minutes on/20 minutes off)  · Starting tomorrow, you may use warm, moist heat or ice if needed  · You may have an increase or change in your discomfort for 36-48 hours after your treatment  · Apply ice and continue with any pain medication you have been prescribed  · Notify [facility] if you have any of the following: redness, drainage, swelling, headache, stiff neck or fever above 100°F     SPECIAL INSTRUCTIONS  · Our office will contact you in approximately 7 days for a progress report  MEDICATIONS  · Continue to take all routine medications  · Our office may have instructed you to hold some medications  If you have a problem specifically related to your procedure, please call our office at [phone]  Problems not related to your procedure should be directed to your primary care physician

## 2019-07-30 NOTE — H&P
History of Present Illness: The patient is a 34 y o  male who presents with complaints of left lower back and leg pain secondary to lumbar disc herniation is here today for left L4-5 transforaminal epidural steroid injection  Patient Active Problem List   Diagnosis    Herniation of lumbar intervertebral disc with radiculopathy       No past medical history on file      Past Surgical History:   Procedure Laterality Date    KNEE ARTHROSCOPY W/ ACL RECONSTRUCTION Right          Current Outpatient Medications:     acetaminophen (TYLENOL) 650 mg CR tablet, Take 1 tablet (650 mg total) by mouth every 8 (eight) hours as needed for mild pain, Disp: 30 tablet, Rfl: 0    diazepam (VALIUM) 5 mg tablet, Take 1 tablet (5 mg total) by mouth daily at bedtime as needed for muscle spasms for up to 4 days, Disp: 4 tablet, Rfl: 0    diclofenac (VOLTAREN) 75 mg EC tablet, Take 1 tablet (75 mg total) by mouth 2 (two) times a day, Disp: 60 tablet, Rfl: 0    gabapentin (NEURONTIN) 300 mg capsule, Take 1 tablet at bedtime for 3 days, then 1 tablet twice daily for 3 days, then 1 tablet 3 times daily, Disp: 90 capsule, Rfl: 0    HYDROcodone-acetaminophen (NORCO) 5-325 mg per tablet, Take 1 tablet by mouth every 6 (six) hours as needed for pain for up to 8 dosesMax Daily Amount: 4 tablets, Disp: 8 tablet, Rfl: 0    Current Facility-Administered Medications:     bupivacaine (PF) (MARCAINE) 0 25 % injection 10 mL, 10 mL, Epidural, Once, Debora Chavez MD    iohexol (OMNIPAQUE) 300 mg/mL injection 50 mL, 50 mL, Epidural, Once, Debora Chavez MD    lidocaine (PF) (XYLOCAINE-MPF) 2 % injection 5 mL, 5 mL, Infiltration, Once, Debora Chavez MD    methylPREDNISolone acetate (DEPO-MEDROL) injection 80 mg, 80 mg, Epidural, Once, Debora Chavez MD    sodium chloride (PF) 0 9 % injection 10 mL, 10 mL, Infiltration, Once, Debora Chavez MD    No Known Allergies    Physical Exam:   Vitals:    07/30/19 0844   BP: 108/71   Pulse: 92 Resp: 20   Temp: 98 3 °F (36 8 °C)   SpO2: 97%     General: Awake, Alert, Oriented x 3, Mood and affect appropriate  Respiratory: Respirations even and unlabored  Cardiovascular: Peripheral pulses intact; no edema  Musculoskeletal Exam:   Left lower back tenderness    ASA Score: 1    Patient/Chart Verification  Patient ID Verified: Verbal  Consents Confirmed: To be obtained in the Pre-Procedure area  H&P( within 30 days) Verified: To be obtained in the Pre-Procedure area  Allergies Reviewed: Yes  Anticoag/NSAID held?: NA  Currently on antibiotics?: No    Assessment:   1   Herniation of lumbar intervertebral disc with radiculopathy        Plan: Left L4-5 TF ROD

## 2019-07-30 NOTE — TELEPHONE ENCOUNTER
Patient's mother called July 27th as the patient was having worsening radicular symptoms in his left lower extremity  No signs or symptoms of cauda equina syndrome  No worsening weakness of the left leg  Did advise the patient that if the pain was too severe he should go to the emergency room for further evaluation  Also advised the patient's mother that he could try increasing his gabapentin to 300 mg in the morning, 300 mg afternoon, and 600 mg at bedtime as he was currently taking 300 mg t i d   Patient did have epidural scheduled for July 30th 2019 with Dr Carmencita Lock  Patient's mother verbalized understanding

## 2019-07-30 NOTE — TELEPHONE ENCOUNTER
Late entry:    Lisseth Odell  Name: Ford Mar  : 1990  Emerg: Y  Msg:  He is in a lot of pain herniated disc  he tried to get up and not able to due to the pain  leg is numb as well  does not know what else to do for him      (Message Delivered)  ------------ SHAINA CLARK P------------- :  2019 07:18p TG  LEFT V/M FOR DR Jose Guerrero MIRZA BACK TO 5600 @ 715PM  2019 07:34p JI  CONNECTED CLR W/ DR Jose Guerrero @ 7:20 PM

## 2019-08-06 ENCOUNTER — TELEPHONE (OUTPATIENT)
Dept: PAIN MEDICINE | Facility: CLINIC | Age: 29
End: 2019-08-06

## 2019-08-06 NOTE — TELEPHONE ENCOUNTER
Patient states she or he has 90% of improvement & 0 pain level  Patient states that he is experiencing a little foot numbness no pain  Patient states he stopped taking his Gabapentin & Diclofenac medications this past Friday 8/2/19 due to his pain relief from his injection  Patient would like to know if requires any kind of stretching exercises & or therapy   Please advisewandy    Call back# [phone]

## 2019-08-08 DIAGNOSIS — M51.16 HERNIATION OF LUMBAR INTERVERTEBRAL DISC WITH RADICULOPATHY: Primary | ICD-10-CM

## 2019-08-08 NOTE — TELEPHONE ENCOUNTER
Pt informed that NM has ordered PT as she noted he had left foot weakness during the exam  Pt will go to  PT in [place]  Pt told he can call and schedule his PT appt  Pt said his mother will be calling, they need to provide some type of ID # for his Ins for the procedure he had  I provided him the phone # for the

## 2019-08-13 DIAGNOSIS — M51.16 HERNIATION OF LUMBAR INTERVERTEBRAL DISC WITH RADICULOPATHY: ICD-10-CM

## 2019-08-14 RX ORDER — DICLOFENAC SODIUM 75 MG/1
TABLET, DELAYED RELEASE ORAL
Qty: 60 TABLET | Refills: 0 | OUTPATIENT
Start: 2019-08-14

## 2019-08-14 RX ORDER — GABAPENTIN 300 MG/1
CAPSULE ORAL
Qty: 90 CAPSULE | Refills: 0 | OUTPATIENT
Start: 2019-08-14

## 2019-08-23 NOTE — PROGRESS NOTES
PT Discharge    Today's date: 2019  Patient name: Jamal Montano  : 1990  MRN: [id number]  Referring provider: Morales Medrano MD  Dx:   Encounter Diagnosis     ICD-10-CM    1  Acute left-sided low back pain with left-sided sciatica M54 42 Ambulatory referral to Pain Management     Patient was referred onto spine and pain due to the severity of his symptoms as well as his weakness  He will be discharged at this time        Assessment/Plan    Subjective    Objective

## 2019-08-26 ENCOUNTER — EVALUATION (OUTPATIENT)
Dept: PHYSICAL THERAPY | Facility: CLINIC | Age: 29
End: 2019-08-26
Payer: COMMERCIAL

## 2019-08-26 DIAGNOSIS — M51.16 HERNIATION OF LUMBAR INTERVERTEBRAL DISC WITH RADICULOPATHY: ICD-10-CM

## 2019-08-26 PROCEDURE — 97162 PT EVAL MOD COMPLEX 30 MIN: CPT | Performed by: PHYSICAL THERAPIST

## 2019-08-26 NOTE — PROGRESS NOTES
PT Evaluation     Today's date: 2019  Patient name: Lonny Garrison  : 1990  MRN: [id number]  Referring provider: Senthil Webster  Dx:   Encounter Diagnosis     ICD-10-CM    1  Herniation of lumbar intervertebral disc with radiculopathy M51 16 Ambulatory referral to Physical Therapy                  Assessment  Assessment details: Patient is a 34year old male with signs and symptoms consistent with lumbar radiculopathy resulting in difficulty with ADLs and functional activities  He has decreased limitations secondary to symptoms since his injection, but does continue with mild residual ROM, flexibility, and strength deficits  He was educated at length regarding positional/postural influence on disc pathology  He verbalized understanding  He does demonstrate mild deficits in lumbar ROM, LE flexibility and nerve mobility, and LE strength, L < R  He was agreeable to POC 2x/week to address these deficits and allow return to PLOF  Impairments: abnormal muscle firing, abnormal or restricted ROM, impaired physical strength, lacks appropriate home exercise program, poor posture  and poor body mechanics  Understanding of Dx/Px/POC: excellent  Goals  ST  Decrease pain at worst to 0/10 in 4 weeks  2  Increase lumbar flexion ROM to 100% in 4 weeks  LT  Able to tolerate dead lift with proper mechanics in 8 weeks  2  Increase LE strength to 5/5 globally in 8 weeks  3  Independent in HEP in 8 weeks       Plan  Patient would benefit from: skilled physical therapy  Planned modality interventions: TENS, thermotherapy: hydrocollator packs and cryotherapy  Planned therapy interventions: abdominal trunk stabilization, body mechanics training, flexibility, functional ROM exercises, home exercise program, therapeutic exercise, therapeutic activities, stretching, strengthening, postural training, patient education, neuromuscular re-education, motor coordination training, manual therapy, massage and joint mobilization  Frequency: 2x week  Duration in weeks: 8  Treatment plan discussed with: patient and referring physician        Subjective Evaluation    History of Present Illness  Mechanism of injury: Patient reports that he started with severe back pain for a little while  He notes that he went to the chiropractor about 4 months ago and he was doing pretty good  He notes that on  he was on vacation and doing well  He notes that he came home from vacation that  and when he came back to work on Monday his back was hurting again and then Tuesday the pain was down his leg and he couldn't stand  He notes that he went to the ER, gave him pain medications/shot which did not provide relief  Then [facility]  He notes that he followed-up on Monday and he was referred to the spine physician  He was informed he had a herniated disc bilaterally at L4-5  So he was then scheduled for an injection two weeks later  He notes that since the injection he's had 90% improvement  He reports that he had injection on 19  He notes that he still has L foot numbness into the calf  He denies any pain or weakness  He denies any saddle paresthesias or changes in bowel or bladder  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  He notes that is working full time and he returned to full time the day after his shot  He notes that he's not playing softball in his recreational league out of fear  He notes that he is able to tolerate riding his bike     Pain  Current pain ratin  At best pain ratin  At worst pain ratin  Quality: tight  Aggravating factors: lifting and sitting  Progression: improved    Social Support  Lives in: multiple-level home  Lives with: parents (roomate)      Diagnostic Tests  X-ray: normal  MRI studies: abnormal  Treatments  Previous treatment: physical therapy, medication and injection treatment  Current treatment: physical therapy  Patient Goals  Patient goals for therapy: decreased pain, increased motion, increased strength, independence with ADLs/IADLs and return to sport/leisure activities  Patient goal: touch my toes; I wanna learn proper stretching        Objective     Concurrent Complaints  Negative for bladder dysfunction, bowel dysfunction and saddle (S4) numbness    Neurological Testing     Sensation     Lumbar   Left   Intact: light touch    Right   Intact: light touch    Reflexes   Left   Patellar (L4): normal (2+)  Achilles (S1): normal (2+)  Clonus sign: negative    Right   Patellar (L4): normal (2+)  Achilles (S1): normal (2+)  Clonus sign: negative    Active Range of Motion     Lumbar   Flexion: 75 degrees   Extension: 75 degrees   Left lateral flexion: 100 degrees       Right lateral flexion: 100 degrees   Left rotation: 75 degrees   Right rotation: 75 degrees     Strength/Myotome Testing     Lumbar   Left   Heel walk: normal  Toe walk: normal    Right   Heel walk: normal  Toe walk: normal    Left Hip   Planes of Motion   Flexion: 4  Extension: 4-  Abduction: 4  External rotation: 4+    Right Hip   Planes of Motion   Flexion: 4  Extension: 4  Abduction: 4+  External rotation: 4-    Left Knee   Flexion: 4-  Extension: 4    Right Knee   Flexion: 4-  Extension: 4    Left Ankle/Foot   Dorsiflexion: 5  Plantar flexion: 5  Inversion: 5  Eversion: 5  Great toe extension: 5    Right Ankle/Foot   Dorsiflexion: 5  Plantar flexion: 5  Inversion: 5  Eversion: 5  Great toe extension: 5    Additional Strength Details  Squat with mild anterior translation of tibia and ER compensation of ankle    Tests     Lumbar     Left   Negative crossed SLR and passive SLR  Right   Negative crossed SLR and passive SLR         Flowsheet Rows      Most Recent Value   PT/OT G-Codes   Current Score  67   Projected Score  80          Diagnosis: Lumbar Radiculopathy   Precautions:    Manual Therapy 8/26/19       Hamstring, Piriformis stretch        Sciatic nerve glides Exercise Diary  8/26/19       Bike        Sciatic Nerve Glides        NITISH        PPU        Piriformis stretch        SLR - ext        Bridges        Deadbugs                Back Bends        Table Hip Sags        TB Pallof        Rows/Extensions        Hip Hinge                                                        Modalities

## 2019-08-29 ENCOUNTER — OFFICE VISIT (OUTPATIENT)
Dept: PHYSICAL THERAPY | Facility: CLINIC | Age: 29
End: 2019-08-29
Payer: COMMERCIAL

## 2019-08-29 DIAGNOSIS — M51.16 HERNIATION OF LUMBAR INTERVERTEBRAL DISC WITH RADICULOPATHY: Primary | ICD-10-CM

## 2019-08-29 PROCEDURE — 97110 THERAPEUTIC EXERCISES: CPT | Performed by: PHYSICAL THERAPIST

## 2019-08-29 PROCEDURE — 97140 MANUAL THERAPY 1/> REGIONS: CPT | Performed by: PHYSICAL THERAPIST

## 2019-08-29 PROCEDURE — 97112 NEUROMUSCULAR REEDUCATION: CPT | Performed by: PHYSICAL THERAPIST

## 2019-08-29 NOTE — PROGRESS NOTES
Daily Note     Today's date: 2019  Patient name: Anh Pineda  : 1990  MRN: [id number]  Referring provider: Lukas Claros  Dx:   Encounter Diagnosis     ICD-10-CM    1  Herniation of lumbar intervertebral disc with radiculopathy M51 16                   Subjective: Patient reports no pain at the start of today's session  He notes that he was compliant with HEP  Objective: See treatment diary below      Assessment: Tolerated treatment well  Patient would benefit from continued PT  He had greater difficulty with L SLR extension than R, but was able to perform without significant lumbar compensation or trunk rotation  He had improved hip mobility evident after passive stretching this date  He would benefit from progression of core and glute strengthening to improve spinal stabilization as able to tolerate without compensations  Plan: Continue per plan of care  Trial pallof as able        Diagnosis: Lumbar Radiculopathy   Precautions:    Manual Therapy 19      Hamstring, Piriformis stretch  8' RS      Sciatic nerve glides  2' RS                              Exercise Diary  19      Bike  5'      Sciatic Nerve Glides  10xea      NITISH  --      PPU  20x      Piriformis stretch  10x10"      SLR - ext  2x10      Bridges  15x      PPT  5"x20 to neutral      Deadbugs                Back Bends        Table Hip Sags        TB Pallof        Rows/Extensions        Hip Hinge                                                        Modalities  19

## 2019-09-03 ENCOUNTER — OFFICE VISIT (OUTPATIENT)
Dept: PHYSICAL THERAPY | Facility: CLINIC | Age: 29
End: 2019-09-03
Payer: COMMERCIAL

## 2019-09-03 DIAGNOSIS — M51.16 HERNIATION OF LUMBAR INTERVERTEBRAL DISC WITH RADICULOPATHY: Primary | ICD-10-CM

## 2019-09-03 PROCEDURE — 97110 THERAPEUTIC EXERCISES: CPT | Performed by: PHYSICAL THERAPIST

## 2019-09-03 PROCEDURE — 97140 MANUAL THERAPY 1/> REGIONS: CPT | Performed by: PHYSICAL THERAPIST

## 2019-09-03 PROCEDURE — 97112 NEUROMUSCULAR REEDUCATION: CPT | Performed by: PHYSICAL THERAPIST

## 2019-09-03 NOTE — PROGRESS NOTES
Daily Note     Today's date: 9/3/2019  Patient name: Abdi Nicholson  : 1990  MRN: [id number]  Referring provider: Chan Mcdaniels  Dx:   Encounter Diagnosis     ICD-10-CM    1  Herniation of lumbar intervertebral disc with radiculopathy M51 16                   Subjective: He denies any back pain since last session or today  He notes that he had some hip soreness after last session, but minimal        Objective: See treatment diary below      Assessment: Tolerated treatment well  Patient would benefit from continued PT  He continues to demonstrate increased lumbar compensation during L SLR extension greater than R secondary to residual L glute weakness  He did have onset of cramping in R hamstring during bridges secondary to glute weakness and being challenged with isometric hold  He was able to perform without isometric hold without onset of cramping  He had greater restriction of L hamstring and piriformis detected with manual stretching this date  Plan: Continue per plan of care  Progress glute and core strengthening as able        Diagnosis: Lumbar Radiculopathy   Precautions:    Manual Therapy 8/26/19 8/29/19 9/3/19     Hamstring, Piriformis stretch  8' RS 8' RS     Sciatic nerve glides  2' RS                              Exercise Diary  8/26/19 8/29/19 9/3/19     Bike  5' 5'      Sciatic Nerve Glides  10xea      NITISH  -- --     PPU  20x 20x     Hamstring stretch   10x10"     Piriformis stretch  10x10"      Glute Sets   5"x20     SLR - ext  2x10 2x10     Bridges  15x 2x10     PPT  5"x20 to neutral      Deadbugs                Back Bends        Table Hip Sags        TB Pallof        Rows/Extensions        Hip Hinge                                                        Modalities  8/29/19 9/3/19

## 2019-09-05 ENCOUNTER — OFFICE VISIT (OUTPATIENT)
Dept: PHYSICAL THERAPY | Facility: CLINIC | Age: 29
End: 2019-09-05
Payer: COMMERCIAL

## 2019-09-05 DIAGNOSIS — M51.16 HERNIATION OF LUMBAR INTERVERTEBRAL DISC WITH RADICULOPATHY: Primary | ICD-10-CM

## 2019-09-05 PROCEDURE — 97140 MANUAL THERAPY 1/> REGIONS: CPT | Performed by: PHYSICAL THERAPIST

## 2019-09-05 PROCEDURE — 97110 THERAPEUTIC EXERCISES: CPT | Performed by: PHYSICAL THERAPIST

## 2019-09-05 PROCEDURE — 97112 NEUROMUSCULAR REEDUCATION: CPT | Performed by: PHYSICAL THERAPIST

## 2019-09-05 NOTE — PROGRESS NOTES
Daily Note     Today's date: 2019  Patient name: Jamal Montano  : 1990  MRN: [id number]  Referring provider: Tresa Solano  Dx:   Encounter Diagnosis     ICD-10-CM    1  Herniation of lumbar intervertebral disc with radiculopathy M51 16                   Subjective: Patient reports that he has no pain today  Objective: See treatment diary below      Assessment: Tolerated treatment well  Patient would benefit from continued PT  He required less time to complete program this date with fewer rest breaks and fewer cues  He was challenged with planks this date  He did require verbal cues to avoid upper trap compensation with resisted rows this date  He would benefit from progression of weight bearing core and hip strengthening  Plan: Continue per plan of care  Trial hip hinge as able        Diagnosis: Lumbar Radiculopathy   Precautions:    Manual Therapy 8/26/19 8/29/19 9/3/19 9/5/19    Hamstring, Piriformis stretch  8' RS 8' RS 8' RS    Sciatic nerve glides  2' RS                              Exercise Diary  8/26/19 8/29/19 9/3/19 9/5/19    Bike  5' 5'      Sciatic Nerve Glides  10xea      NITISH  -- --     PPU  20x 20x 20x    Hamstring stretch   10x10" 10x10"    Piriformis stretch  10x10"  10x10"    Glute Sets   5"x20     SLR - ext  2x10 2x10 20xea    Bridges  15x 2x10 20x    PPT  5"x20 to neutral  5"x20    Deadbugs        Planks    2x30"    Back Bends        Table Hip Sags        TB Pallof    15# 20xea    Rows/Extensions    25# 20x    Hip Hinge                                                        Modalities  8/29/19 9/3/19 9/5/19

## 2019-09-09 ENCOUNTER — OFFICE VISIT (OUTPATIENT)
Dept: PHYSICAL THERAPY | Facility: CLINIC | Age: 29
End: 2019-09-09
Payer: COMMERCIAL

## 2019-09-09 DIAGNOSIS — M51.16 HERNIATION OF LUMBAR INTERVERTEBRAL DISC WITH RADICULOPATHY: Primary | ICD-10-CM

## 2019-09-09 PROCEDURE — 97110 THERAPEUTIC EXERCISES: CPT | Performed by: PHYSICAL THERAPIST

## 2019-09-09 PROCEDURE — 97112 NEUROMUSCULAR REEDUCATION: CPT | Performed by: PHYSICAL THERAPIST

## 2019-09-09 NOTE — PROGRESS NOTES
Daily Note     Today's date: 2019  Patient name: Christian Coon  : 1990  MRN: [id number]  Referring provider: Sarah Peters  Dx:   Encounter Diagnosis     ICD-10-CM    1  Herniation of lumbar intervertebral disc with radiculopathy M51 16                   Subjective: Patient reports that he hasn't had any pain since last session  Objective: See treatment diary below      Assessment: Tolerated treatment well  Patient would benefit from continued PT  Patient did have one onset of hamstring cramping during bridges  He had greater difficulty with clamshells on L side vs  R secondary to residual strength deficits  He was challenged with reciprocal deadbugs this date  He would benefit from reciprocal UE and LE movements to include posterior sling and single leg stance positions  He continues to progress toward long-term functional goals  Plan: Continue per plan of care  Trial  kneel single arm LPD        Diagnosis: Lumbar Radiculopathy   Precautions:    Manual Therapy 8/26/19 8/29/19 9/3/19 9/5/19 9/9/19   Hamstring, Piriformis stretch  8' RS 8' RS 8' RS    Sciatic nerve glides  2' RS                              Exercise Diary  8/26/19 8/29/19 9/3/19 9/5/19 9/9/19   Bike  5' 5'   5'    Sciatic Nerve Glides  10xea      NITISH  -- --     PPU  20x 20x 20x    Hamstring stretch   10x10" 10x10"    Piriformis stretch  10x10"  10x10" 10x10"   Glute Sets   5"x20     SLR - ext  2x10 2x10 20xea    Bridges  15x 2x10 20x Grn 3"x20   PPT  5"x20 to neutral  5"x20    Deadbugs     20x   Planks    2x30"    Clamshells     Grn 15xea           Standing Hamstring stretch        Seated piriformis stretch     10x10"           Back Bends        Table Hip Sags        TB Pallof    15# 20xea    Rows/Extensions    25# 20x    Hip Hinge         kneel single arm LPD                                                Modalities  8/29/19 9/3/19 9/5/19 9/9/19

## 2019-09-12 ENCOUNTER — OFFICE VISIT (OUTPATIENT)
Dept: PHYSICAL THERAPY | Facility: CLINIC | Age: 29
End: 2019-09-12
Payer: COMMERCIAL

## 2019-09-12 DIAGNOSIS — M51.16 HERNIATION OF LUMBAR INTERVERTEBRAL DISC WITH RADICULOPATHY: Primary | ICD-10-CM

## 2019-09-12 PROCEDURE — 97112 NEUROMUSCULAR REEDUCATION: CPT | Performed by: PHYSICAL THERAPIST

## 2019-09-12 PROCEDURE — 97110 THERAPEUTIC EXERCISES: CPT | Performed by: PHYSICAL THERAPIST

## 2019-09-12 NOTE — PROGRESS NOTES
Daily Note     Today's date: 2019  Patient name: Daniela Trammell  : 1990  MRN: [id number]  Referring provider: Regina Quinones  Dx:   Encounter Diagnosis     ICD-10-CM    1  Herniation of lumbar intervertebral disc with radiculopathy M51 16                   Subjective: Patient reports that he is more fearful than painful lately  Objective: See treatment diary below      Assessment: Tolerated treatment well  Patient would benefit from continued PT  He was able to perofrm marching bridges and single leg bridges without cramping of hamstring  He was able to perform dead bugs with better form with 10# DBs without loss of core control  He remains challenged with maintaining neutral spine with UE and LE reciprocal movements  He tolerated single arm lat pulldown well this date with only mild difficulty maintaining neutral spine  Plan: Continue per plan of care  Trial alter-g as able        Diagnosis: Lumbar Radiculopathy   Precautions:    Manual Therapy 9/12/19 8/29/19 9/3/19 9/5/19 9/9/19   Hamstring, Piriformis stretch  8' RS 8' RS 8' RS    Sciatic nerve glides  2' RS                              Exercise Diary  9/12/19 8/29/19 9/3/19 9/5/19 9/9/19   Bike 5'  5' 5'   5'    Sciatic Nerve Glides  10xea      NITISH  -- --     PPU  20x 20x 20x    Hamstring stretch   10x10" 10x10"    Piriformis stretch  10x10"  10x10" 10x10"   Glute Sets   5"x20     SLR - ext  2x10 2x10 20xea    Bridges Marching 20x  SL 2x10ea 15x 2x10 20x Grn 3"x20   PPT  5"x20 to neutral  5"x20    Deadbugs 2x10 10#    20x   Planks    2x30"    Clamshells     Grn 15xea           Standing Hamstring stretch        Seated piriformis stretch     10x10"           Back Bends        Table Hip Sags        TB Pallof 1/2 kneel 15# 30xea   15# 20xea    Rows/Extensions    25# 20x    Hip Hinge        1/2 kneel single arm LPD 25# 20xea       Leg Press 210# 3"iso, 3" eccentric 3x10                                       Modalities  19 9/3/19 9/5/19 9/9/19

## 2019-09-16 ENCOUNTER — APPOINTMENT (OUTPATIENT)
Dept: PHYSICAL THERAPY | Facility: CLINIC | Age: 29
End: 2019-09-16
Payer: COMMERCIAL

## 2019-09-19 ENCOUNTER — OFFICE VISIT (OUTPATIENT)
Dept: PHYSICAL THERAPY | Facility: CLINIC | Age: 29
End: 2019-09-19
Payer: COMMERCIAL

## 2019-09-19 DIAGNOSIS — M51.16 HERNIATION OF LUMBAR INTERVERTEBRAL DISC WITH RADICULOPATHY: Primary | ICD-10-CM

## 2019-09-19 PROCEDURE — 97116 GAIT TRAINING THERAPY: CPT | Performed by: PHYSICAL THERAPIST

## 2019-09-19 PROCEDURE — 97110 THERAPEUTIC EXERCISES: CPT | Performed by: PHYSICAL THERAPIST

## 2019-09-19 PROCEDURE — 97112 NEUROMUSCULAR REEDUCATION: CPT | Performed by: PHYSICAL THERAPIST

## 2019-09-19 NOTE — PROGRESS NOTES
Daily Note     Today's date: 2019  Patient name: Bre Weber  : 1990  MRN: [id number]  Referring provider: Onelia Hampton  Dx:   Encounter Diagnosis     ICD-10-CM    1  Herniation of lumbar intervertebral disc with radiculopathy M51 16                   Subjective: Patient denies any pain in the past two weeks, but notes that he has not yet returned to lifting or running as he is scared  Objective: See treatment diary below      Assessment: Tolerated treatment well  Patient would benefit from continued PT  He tolerated running in alter g between 90%-100% body weight this date  He had no increased subjective report of pain with running this date  He also tolerated progression of resistance for leg press  He fatigued with x-walks this date  He would benefit from progression of hip movements to include squat and deadlift  Plan: Continue per plan of care  Progress strengthening of core and hips in preparation for transition to HEP        Diagnosis: Lumbar Radiculopathy   Precautions:    Manual Therapy 9/12/19 9/19/19 9/3/19 9/5/19 9/9/19   Hamstring, Piriformis stretch   8' RS 8' RS    Sciatic nerve glides                                Exercise Diary  9/12/19 9/19/19 9/3/19 9/5/19 9/9/19   Bike 5'  5' 5'   5'    Sciatic Nerve Glides        NITISH   --     PPU   20x 20x    Hamstring stretch   10x10" 10x10"    Piriformis stretch    10x10" 10x10"   Glute Sets   5"x20     SLR - ext   2x10 20xea    Bridges Marching 20x  SL 2x10ea  2x10 20x Grn 3"x20   PPT    5"x20    Deadbugs 2x10 10#    20x   Planks    2x30"    Clamshells     Grn 15xea           Standing Hamstring stretch        Seated piriformis stretch     10x10"           Back Bends        Table Hip Sags        TB Pallof 1/2 kneel 15# 30xea   15# 20xea    Rows/Extensions    25# 20x    Hip Hinge        1/2 kneel single arm LPD 25# 20xea       Leg Press 210# 3"iso, 3" eccentric 3x10 305# 3x10 DL      Alter G  % BW 10'       X Walks  Blue 2 laps      Dead Lift        Squats        Modalities   9/3/19 9/5/19 9/9/19

## 2019-09-23 ENCOUNTER — EVALUATION (OUTPATIENT)
Dept: PHYSICAL THERAPY | Facility: CLINIC | Age: 29
End: 2019-09-23
Payer: COMMERCIAL

## 2019-09-23 DIAGNOSIS — M51.16 HERNIATION OF LUMBAR INTERVERTEBRAL DISC WITH RADICULOPATHY: Primary | ICD-10-CM

## 2019-09-23 PROCEDURE — 97110 THERAPEUTIC EXERCISES: CPT | Performed by: PHYSICAL THERAPIST

## 2019-09-23 PROCEDURE — 97140 MANUAL THERAPY 1/> REGIONS: CPT | Performed by: PHYSICAL THERAPIST

## 2019-09-23 PROCEDURE — 97112 NEUROMUSCULAR REEDUCATION: CPT | Performed by: PHYSICAL THERAPIST

## 2019-09-23 NOTE — PROGRESS NOTES
PT Re-Evaluation     Today's date: 2019  Patient name: Dolly Tinoco  : 1990  MRN: [id number]  Referring provider: Darron Pollack  Dx:   Encounter Diagnosis     ICD-10-CM    1  Herniation of lumbar intervertebral disc with radiculopathy M51 16                   Assessment  Assessment details: Patient is a 34year old male with signs and symptoms consistent with lumbar radiculopathy resulting in difficulty with ADLs and functional activities  He demonstrates improved lumbar ROM and increased strength  His deficits do continue to be greater on L than R  He was encouraged to return to the gym to assess response prior to discharge  He is appropriate for transition to HEP next visit if no change in status as he met functional and objective goals  Impairments: impaired physical strength and lacks appropriate home exercise program  Understanding of Dx/Px/POC: excellent  Goals  ST  Decrease pain at worst to 0/10 in 4 weeks  - Partially Met  2  Increase lumbar flexion ROM to 100% in 4 weeks  - Met    LT  Able to tolerate dead lift with proper mechanics in 8 weeks  - MEt  2  Increase LE strength to 5/5 globally in 8 weeks  - Partially Met  3  Independent in HEP in 8 weeks  Plan  Plan details: ELOS ~ 1 week for transition to HEP     Patient would benefit from: skilled physical therapy  Planned modality interventions: TENS, thermotherapy: hydrocollator packs and cryotherapy  Planned therapy interventions: abdominal trunk stabilization, body mechanics training, flexibility, functional ROM exercises, home exercise program, therapeutic exercise, therapeutic activities, stretching, strengthening, postural training, patient education, neuromuscular re-education, motor coordination training, manual therapy, massage and joint mobilization  Frequency: 2x week  Duration in weeks: 2  Treatment plan discussed with: patient and referring physician        Subjective Evaluation    Pain  Current pain ratin  At best pain ratin  At worst pain rating: 3  Quality: tight  Aggravating factors: lifting  Progression: resolved    Social Support  Lives in: multiple-level home  Lives with: parents (roomate)      Diagnostic Tests  X-ray: normal  MRI studies: abnormal  Treatments  Previous treatment: physical therapy, medication and injection treatment  Current treatment: physical therapy  Patient Goals  Patient goals for therapy: increased strength and return to sport/leisure activities  Patient goal: touch my toes; I wanna learn proper stretching - MET    Patient reports 98% improvement since start of PT  He notes that he hasn't had any back pain in the last two weeks  He notes that he is able to move  He reports that ADLs and work have returned to normal, but he has not yet returned to lifting at the gym  He notes that he plans to this week  He does report intermittent pain in the back, but just like a quick pull or tightness       Objective     Concurrent Complaints  Negative for bladder dysfunction, bowel dysfunction and saddle (S4) numbness    Neurological Testing     Sensation     Lumbar   Left   Intact: light touch    Right   Intact: light touch    Reflexes   Left   Patellar (L4): normal (2+)  Achilles (S1): normal (2+)  Clonus sign: negative    Right   Patellar (L4): normal (2+)  Achilles (S1): normal (2+)  Clonus sign: negative    Active Range of Motion     Lumbar   Flexion: 100 degrees   Extension: 100 degrees   Left lateral flexion: 100 degrees       Right lateral flexion: 100 degrees   Left rotation: 100 degrees   Right rotation: 100 degrees     Strength/Myotome Testing     Lumbar   Left   Heel walk: normal  Toe walk: normal    Right   Heel walk: normal  Toe walk: normal    Left Hip   Planes of Motion   Flexion: 4+  Extension: 4  Abduction: 4  External rotation: 5    Right Hip   Planes of Motion   Flexion: 5  Extension: 5  Abduction: 4+  External rotation: 4+    Left Knee   Flexion: 4  Extension: 4+    Right Knee   Flexion: 4+  Extension: 5    Left Ankle/Foot   Dorsiflexion: 5  Plantar flexion: 5  Inversion: 5  Eversion: 5  Great toe extension: 5    Right Ankle/Foot   Dorsiflexion: 5  Plantar flexion: 5  Inversion: 5  Eversion: 5  Great toe extension: 5    Additional Strength Details  Squat with mild anterior translation of tibia and ER compensation of ankle    Tests     Lumbar     Left   Negative crossed SLR and passive SLR  Right   Negative crossed SLR and passive SLR            Diagnosis: Lumbar Radiculopathy   Precautions:    Manual Therapy 9/12/19 9/19/19 9/23/19 9/5/19 9/9/19   Hamstring, Piriformis stretch    8' RS    Sciatic nerve glides                        Re-Evaluation   15' RS     Exercise Diary  9/12/19 9/19/19 9/23/19 9/5/19 9/9/19   Treadmill   5'      Bike 5'  5'   5'    Sciatic Nerve Glides        NITISH        PPU    20x    Hamstring stretch    10x10"    Piriformis stretch    10x10" 10x10"   Glute Sets        SLR - ext    20xea    Bridges Marching 20x  SL 2x10ea   20x Grn 3"x20   PPT    5"x20    Deadbugs 2x10 10#    20x   Planks    2x30"    Clamshells     Grn 15xea           Standing Hamstring stretch        Seated piriformis stretch     10x10"           Back Bends        Table Hip Sags        TB Pallof 1/2 kneel 15# 30xea   15# 20xea    Rows/Extensions    25# 20x    Hip Hinge        1/2 kneel single arm LPD 25# 20xea       Leg Press 210# 3"iso, 3" eccentric 3x10 305# 3x10 # DL 3x10 DL     Alter G  % BW 10'       X Walks  Blue 2 laps      Hip Hinge   20x                     Dead Lift   Cane 10x  20# DB 2x10     Squats   Cane back and front 10xea     Modalities     9/9/19

## 2019-09-26 ENCOUNTER — EVALUATION (OUTPATIENT)
Dept: PHYSICAL THERAPY | Facility: CLINIC | Age: 29
End: 2019-09-26
Payer: COMMERCIAL

## 2019-09-26 DIAGNOSIS — M51.16 HERNIATION OF LUMBAR INTERVERTEBRAL DISC WITH RADICULOPATHY: Primary | ICD-10-CM

## 2019-09-26 PROCEDURE — 97110 THERAPEUTIC EXERCISES: CPT | Performed by: PHYSICAL THERAPIST

## 2019-09-26 PROCEDURE — 97140 MANUAL THERAPY 1/> REGIONS: CPT | Performed by: PHYSICAL THERAPIST

## 2019-09-26 NOTE — PROGRESS NOTES
PT Discharge Summary    Today's date: 2019  Patient name: Vishal Lindquist  : 1990  MRN: [id number]  Referring provider: Linda Pearce  Dx:   Encounter Diagnosis     ICD-10-CM    1  Herniation of lumbar intervertebral disc with radiculopathy M51 16                   Assessment  Assessment details: Patient is a 34year old male with signs and symptoms consistent with lumbar radiculopathy resulting in difficulty with ADLs and functional activities  He demonstrates improved lumbar ROM and increased strength  His deficits do continue to be greater on L than R  He has met functional and objective goals with a strong understanding of postural awareness and positional modifications  He is appropriate for discharge to [facility] at this time  Goals  ST  Decrease pain at worst to 0/10 in 4 weeks  - Met  2  Increase lumbar flexion ROM to 100% in 4 weeks  - Met    LT  Able to tolerate dead lift with proper mechanics in 8 weeks  - Met  2  Increase LE strength to 5/5 globally in 8 weeks  - Partially Met  3  Independent in HEP in 8 weeks  - Met    Plan  Plan details: Discharge to [facility]  Planned therapy interventions: home exercise program  Treatment plan discussed with: patient and referring physician        Subjective Evaluation    Pain  Current pain ratin  At best pain ratin  At worst pain ratin  Quality: tight  Aggravating factors: lifting  Progression: resolved    Social Support  Lives in: multiple-level home  Lives with: parents (roomate)      Diagnostic Tests  X-ray: normal  MRI studies: abnormal  Treatments  Previous treatment: physical therapy, medication and injection treatment  Current treatment: physical therapy  Patient Goals  Patient goals for therapy: increased strength and return to sport/leisure activities  Patient goal: touch my toes; I wanna learn proper stretching - MET    Patient reports 98% improvement since start of PT  He denies any pain in the last several weeks  He notes that he did return to the gym and focused on shoulders  He notes that he did stay light and do more repetitions  He notes that he does have slight difference in sensation in the legs  He reports confidence in ability to continue with HEP  Objective     Concurrent Complaints  Negative for bladder dysfunction, bowel dysfunction and saddle (S4) numbness    Neurological Testing     Sensation     Lumbar   Left   Intact: light touch    Right   Intact: light touch    Reflexes   Left   Patellar (L4): normal (2+)  Achilles (S1): normal (2+)  Clonus sign: negative    Right   Patellar (L4): normal (2+)  Achilles (S1): normal (2+)  Clonus sign: negative    Active Range of Motion     Lumbar   Flexion: 100 degrees   Extension: 100 degrees   Left lateral flexion: 100 degrees       Right lateral flexion: 100 degrees   Left rotation: 100 degrees   Right rotation: 100 degrees     Strength/Myotome Testing     Lumbar   Left   Heel walk: normal  Toe walk: normal    Right   Heel walk: normal  Toe walk: normal    Left Hip   Planes of Motion   Flexion: 4+  Extension: 4  Abduction: 4  External rotation: 5    Right Hip   Planes of Motion   Flexion: 5  Extension: 5  Abduction: 4+  External rotation: 4+    Left Knee   Flexion: 4  Extension: 4+    Right Knee   Flexion: 4+  Extension: 5    Left Ankle/Foot   Dorsiflexion: 5  Plantar flexion: 5  Inversion: 5  Eversion: 5  Great toe extension: 5    Right Ankle/Foot   Dorsiflexion: 5  Plantar flexion: 5  Inversion: 5  Eversion: 5  Great toe extension: 5    Additional Strength Details  Squat with mild anterior translation of tibia and ER compensation of ankle    Tests     Lumbar     Left   Negative crossed SLR and passive SLR  Right   Negative crossed SLR and passive SLR  General Comments:      Lumbar Comments  All objective measurements assessed at last session on 9/24/19        Flowsheet Rows      Most Recent Value   PT/OT G-Codes   Current Score  94   Projected Score  80   FOTO information reviewed  Yes         Diagnosis: Lumbar Radiculopathy   Precautions:    Manual Therapy 9/12/19 9/19/19 9/23/19 9/26/19 9/9/19   Hamstring, Piriformis stretch        Sciatic nerve glides                        Re-Evaluation   15' RS 10' RS    Exercise Diary  9/12/19 9/19/19 9/23/19 9/26/19 9/9/19   Treadmill   5'      Bike 5'  5'   5'    Sciatic Nerve Glides        NITISH        PPU        Hamstring stretch        Piriformis stretch     10x10"   Glute Sets        SLR - ext        Bridges Marching 20x  SL 2x10ea    Grn 3"x20   PPT        Deadbugs 2x10 10#    20x   Planks        Clamshells     Grn 15xea           Standing Hamstring stretch        Seated piriformis stretch     10x10"           Back Bends        Table Hip Sags        TB Pallof 1/2 kneel 15# 30xea       Rows/Extensions        Hip Hinge        1/2 kneel single arm LPD 25# 20xea       Leg Press 210# 3"iso, 3" eccentric 3x10 305# 3x10 # DL 3x10 DL     Alter G  % BW 10'       X Walks  Blue 2 laps      Hip Hinge   20x                     Dead Lift   Cane 10x  20# DB 2x10     HEP Review    10' RS    Squats   Cane back and front 10xea     Modalities     9/9/19

## 2020-12-28 ENCOUNTER — TELEPHONE (OUTPATIENT)
Dept: OTHER | Facility: OTHER | Age: 30
End: 2020-12-28

## 2020-12-28 ENCOUNTER — TELEPHONE (OUTPATIENT)
Dept: PAIN MEDICINE | Facility: CLINIC | Age: 30
End: 2020-12-28

## 2020-12-28 ENCOUNTER — OFFICE VISIT (OUTPATIENT)
Dept: PAIN MEDICINE | Facility: CLINIC | Age: 30
End: 2020-12-28
Payer: COMMERCIAL

## 2020-12-28 VITALS
SYSTOLIC BLOOD PRESSURE: 128 MMHG | HEART RATE: 99 BPM | HEIGHT: 72 IN | BODY MASS INDEX: 28.44 KG/M2 | WEIGHT: 210 LBS | RESPIRATION RATE: 18 BRPM | DIASTOLIC BLOOD PRESSURE: 88 MMHG

## 2020-12-28 DIAGNOSIS — M51.26 LUMBAR DISC HERNIATION: ICD-10-CM

## 2020-12-28 DIAGNOSIS — G89.29 CHRONIC RIGHT-SIDED LOW BACK PAIN WITH RIGHT-SIDED SCIATICA: ICD-10-CM

## 2020-12-28 DIAGNOSIS — G89.4 CHRONIC PAIN SYNDROME: ICD-10-CM

## 2020-12-28 DIAGNOSIS — M54.16 LUMBAR RADICULOPATHY: ICD-10-CM

## 2020-12-28 DIAGNOSIS — M54.41 CHRONIC RIGHT-SIDED LOW BACK PAIN WITH RIGHT-SIDED SCIATICA: ICD-10-CM

## 2020-12-28 PROCEDURE — 99214 OFFICE O/P EST MOD 30 MIN: CPT | Performed by: NURSE PRACTITIONER

## 2020-12-29 ENCOUNTER — TRANSCRIBE ORDERS (OUTPATIENT)
Dept: PAIN MEDICINE | Facility: CLINIC | Age: 30
End: 2020-12-29

## 2020-12-30 ENCOUNTER — HOSPITAL ENCOUNTER (OUTPATIENT)
Dept: RADIOLOGY | Facility: CLINIC | Age: 30
Discharge: HOME | End: 2020-12-30
Attending: ANESTHESIOLOGY
Payer: COMMERCIAL

## 2020-12-30 VITALS
RESPIRATION RATE: 20 BRPM | TEMPERATURE: 97.3 F | HEART RATE: 95 BPM | SYSTOLIC BLOOD PRESSURE: 126 MMHG | OXYGEN SATURATION: 98 % | DIASTOLIC BLOOD PRESSURE: 79 MMHG

## 2020-12-30 DIAGNOSIS — M51.26 LUMBAR DISC HERNIATION: ICD-10-CM

## 2020-12-30 DIAGNOSIS — M54.16 LUMBAR RADICULOPATHY: ICD-10-CM

## 2020-12-30 PROCEDURE — 64484 NJX AA&/STRD TFRM EPI L/S EA: CPT | Performed by: ANESTHESIOLOGY

## 2020-12-30 PROCEDURE — 64483 NJX AA&/STRD TFRM EPI L/S 1: CPT | Performed by: ANESTHESIOLOGY

## 2020-12-30 RX ORDER — 0.9 % SODIUM CHLORIDE 0.9 %
10 VIAL (ML) INJECTION ONCE
Status: COMPLETED | OUTPATIENT
Start: 2020-12-30 | End: 2020-12-30

## 2020-12-30 RX ORDER — METHYLPREDNISOLONE ACETATE 80 MG/ML
80 INJECTION, SUSPENSION INTRA-ARTICULAR; INTRALESIONAL; INTRAMUSCULAR; PARENTERAL; SOFT TISSUE ONCE
Status: COMPLETED | OUTPATIENT
Start: 2020-12-30 | End: 2020-12-30

## 2020-12-30 RX ORDER — BUPIVACAINE HCL/PF 2.5 MG/ML
10 VIAL (ML) INJECTION ONCE
Status: COMPLETED | OUTPATIENT
Start: 2020-12-30 | End: 2020-12-30

## 2020-12-30 RX ADMIN — SODIUM CHLORIDE 4 ML: 9 INJECTION, SOLUTION INTRAMUSCULAR; INTRAVENOUS; SUBCUTANEOUS at 10:01

## 2020-12-30 RX ADMIN — IOHEXOL 1 ML: 300 INJECTION, SOLUTION INTRAVENOUS at 10:03

## 2020-12-30 RX ADMIN — BUPIVACAINE HYDROCHLORIDE 2 ML: 2.5 INJECTION, SOLUTION EPIDURAL; INFILTRATION; INTRACAUDAL at 10:03

## 2020-12-30 RX ADMIN — METHYLPREDNISOLONE ACETATE 80 MG: 80 INJECTION, SUSPENSION INTRA-ARTICULAR; INTRALESIONAL; INTRAMUSCULAR; PARENTERAL; SOFT TISSUE at 10:03

## 2020-12-30 RX ADMIN — Medication 4 ML: at 10:01

## 2020-12-30 NOTE — H&P
History of Present Illness: The patient is a 31 y o  male who presents with complaints of right lower back and leg pain secondary lumbar disc herniation is here today for right L4-5 transforaminal epidural steroid injection  Patient Active Problem List   Diagnosis    Herniation of lumbar intervertebral disc with radiculopathy       No past medical history on file  Past Surgical History:   Procedure Laterality Date    KNEE ARTHROSCOPY W/ ACL RECONSTRUCTION Right        No current outpatient medications on file  Current Facility-Administered Medications:     bupivacaine (PF) (MARCAINE) 0 25 % injection 10 mL, 10 mL, Epidural, Once, Brandy Handy MD    iohexol (OMNIPAQUE) 300 mg/mL injection 50 mL, 50 mL, Epidural, Once, Brandy Handy MD    lidocaine (PF) (XYLOCAINE-MPF) 2 % injection 5 mL, 5 mL, Infiltration, Once, Brandy Handy MD    methylPREDNISolone acetate (DEPO-MEDROL) injection 80 mg, 80 mg, Epidural, Once, Brandy Handy MD    sodium chloride (PF) 0 9 % injection 10 mL, 10 mL, Infiltration, Once, Brandy Handy MD    No Known Allergies    Physical Exam:   Vitals:    12/30/20 0939   BP: 128/85   Pulse: 105   Resp: 18   Temp: (!) 97 3 °F (36 3 °C)   SpO2: 96%     General: Awake, Alert, Oriented x 3, Mood and affect appropriate  Respiratory: Respirations even and unlabored  Cardiovascular: Peripheral pulses intact; no edema  Musculoskeletal Exam:  Right lower back tenderness    ASA Score: 1    Patient/Chart Verification  Patient ID Verified: Verbal  ID Band Applied: No  Consents Confirmed: Procedural, To be obtained in the Pre-Procedure area  H&P( within 30 days) Verified: To be obtained in the Pre-Procedure area  Allergies Reviewed: Yes  Anticoag/NSAID held?: No  Currently on antibiotics?: No    Assessment:   1  Lumbar disc herniation    2   Lumbar radiculopathy        Plan: Right L4-L5 TFESI

## 2020-12-30 NOTE — DISCHARGE INSTR - LAB
Epidural Steroid Injection   WHAT YOU NEED TO KNOW:   An epidural steroid injection (ROD) is a procedure to inject steroid medicine into the epidural space  The epidural space is between your spinal cord and vertebrae  Steroids reduce inflammation and fluid buildup in your spine that may be causing pain  You may be given pain medicine along with the steroids  ACTIVITY  · Do not drive or operate machinery today  · No strenuous activity today - bending, lifting, etc   · You may resume normal activites starting tomorrow - start slowly and as tolerated  · You may shower today, but no tub baths or hot tubs  · You may have numbness for several hours from the local anesthetic  Please use caution and common sense, especially with weight-bearing activities  CARE OF THE INJECTION SITE  · If you have soreness or pain, apply ice to the area today (20 minutes on/20 minutes off)  · Starting tomorrow, you may use warm, moist heat or ice if needed  · You may have an increase or change in your discomfort for 36-48 hours after your treatment  · Apply ice and continue with any pain medication you have been prescribed  · Notify [facility] if you have any of the following: redness, drainage, swelling, headache, stiff neck or fever above 100°F     SPECIAL INSTRUCTIONS  · Our office will contact you in approximately 7 days for a progress report  MEDICATIONS  · Continue to take all routine medications  · Our office may have instructed you to hold some medications  If you have a problem specifically related to your procedure, please call our office at [phone]  Problems not related to your procedure should be directed to your primary care physician

## 2021-01-06 ENCOUNTER — TELEPHONE (OUTPATIENT)
Dept: PAIN MEDICINE | Facility: CLINIC | Age: 31
End: 2021-01-06

## 2021-01-06 NOTE — TELEPHONE ENCOUNTER
Pt reports 10% improvement post inj   Pain level 7/10   Pt aware I will call next week for an update

## 2021-01-13 ENCOUNTER — TELEPHONE (OUTPATIENT)
Dept: PAIN MEDICINE | Facility: CLINIC | Age: 31
End: 2021-01-13

## 2021-01-13 DIAGNOSIS — M51.16 HERNIATION OF LUMBAR INTERVERTEBRAL DISC WITH RADICULOPATHY: Primary | ICD-10-CM

## 2021-01-13 NOTE — TELEPHONE ENCOUNTER
S/w pt, advised of the same  Provided # for central scheduling, pt aware [facility] will call once results have been reviewed by [facility]  Pt verbalized understanding and appreciation

## 2021-01-20 ENCOUNTER — TELEPHONE (OUTPATIENT)
Dept: RADIOLOGY | Facility: CLINIC | Age: 31
End: 2021-01-20

## 2021-01-20 NOTE — TELEPHONE ENCOUNTER
I s/w pt and explained that his MRI was denied  He said he s/w his Ins Co this am and they told him all it needed was an PA  I explained that our staff already did the authorization and the Ins co denied it b/c they are looking for 6 wks of PT to have been completed in the past 6 mo or a peer to peer has to be done  I explained that is why  has scheduled pt to come into the office on 1/26 is for a re-eval w/ NP prior to them doing the peer to peer

## 2021-01-20 NOTE — TELEPHONE ENCOUNTER
Sorry there are million messages on him   i'm having the front office staff scheduled an office visit for him for re-eval prior to peer 2 peer

## 2021-01-20 NOTE — TELEPHONE ENCOUNTER
Received: Today  Message Contents   Kyra Mckenzie MD   Cc: [facility]; Dione Joyner             Good Morning,     I have corrected the message to read:     S/ W Patti COLUNGA @ [facility], the MRI L-Spine wo contrast has not been approved; looking for 6 weeks of physical therapy which has been completed in the last 6 months     Requesting a peer to peer by calling; [phone], opt #3 and reference case #[id number]       Please notify the patien

## 2021-01-26 ENCOUNTER — OFFICE VISIT (OUTPATIENT)
Dept: PAIN MEDICINE | Facility: CLINIC | Age: 31
End: 2021-01-26
Payer: COMMERCIAL

## 2021-01-26 VITALS
SYSTOLIC BLOOD PRESSURE: 126 MMHG | RESPIRATION RATE: 18 BRPM | WEIGHT: 209 LBS | HEIGHT: 72 IN | BODY MASS INDEX: 28.31 KG/M2 | HEART RATE: 104 BPM | DIASTOLIC BLOOD PRESSURE: 78 MMHG

## 2021-01-26 DIAGNOSIS — M51.26 LUMBAR DISC HERNIATION: ICD-10-CM

## 2021-01-26 DIAGNOSIS — G89.4 CHRONIC PAIN SYNDROME: Primary | ICD-10-CM

## 2021-01-26 DIAGNOSIS — M54.16 LUMBAR RADICULOPATHY: ICD-10-CM

## 2021-01-26 PROCEDURE — 99214 OFFICE O/P EST MOD 30 MIN: CPT | Performed by: NURSE PRACTITIONER

## 2021-01-26 NOTE — PATIENT INSTRUCTIONS
Lower Back Exercises   AMBULATORY CARE:   Lower back exercises  help heal and strengthen your back muscles to prevent another injury  Ask your healthcare provider if you need to see a physical therapist for more advanced exercises  Seek care immediately if:   · You have severe pain that prevents you from moving  Contact your healthcare provider if:   · Your pain becomes worse  · You have new pain  · You have questions or concerns about your condition or care  Do lower back exercises safely:   · Do the exercises on a mat or firm surface  (not on a bed) to support your spine and prevent low back pain  · Move slowly and smoothly  Avoid fast or jerky motions  · Breathe normally  Do not hold your breath  · Stop if you feel pain  It is normal to feel some discomfort at first  Regular exercise will help decrease your discomfort over time  Lower back exercises: Your healthcare provider may recommend that you do back exercises 10 to 30 minutes each day  He may also recommend that you do exercises 1 to 3 times each day  Ask your healthcare provider which exercises are best for you and how often to do them  · Ankle pumps:  Lie on your back  Move your foot up (with your toes pointing toward your head)  Then, move your foot down (with your toes pointing away from you)  Repeat this exercise 10 times on each side  · Heel slides:  Lie on your back  Slowly bend one leg and then straighten it  Next, bend the other leg and then straighten it  Repeat 10 times on each side  · Pelvic tilt:  Lie on your back with your knees bent and feet flat on the floor  Place your arms in a relaxed position beside your body  Tighten the muscles of your abdomen and flatten your back against the floor  Hold for 5 seconds  Repeat 5 times  · Back stretch:  Lie on your back with your hands behind your head  Bend your knees and turn the lower half of your body to one side   Hold this position for 10 seconds  Repeat 3 times on each side  · Straight leg raises:  Lie on your back with one leg straight  Bend the other knee  Tighten your abdomen and then slowly lift the straight leg up about 6 to 12 inches off the floor  Hold for 1 to 5 seconds  Lower your leg slowly  Repeat 10 times on each leg  · Knee-to-chest:  Lie on your back with your knees bent and feet flat on the floor  Pull one of your knees toward your chest and hold it there for 5 seconds  Return your leg to the starting position  Lift the other knee toward your chest and hold for 5 seconds  Do this 5 times on each side  · Cat and camel:  Place your hands and knees on the floor  Arch your back upward toward the ceiling and lower your head  Round out your spine as much as you can  Hold for 5 seconds  Lift your head upward and push your chest downward toward the floor  Hold for 5 seconds  Do 3 sets or as directed  · Wall squats:  Stand with your back against a wall  Tighten the muscles of your abdomen  Slowly lower your body until your knees are bent at a 45 degree angle  Hold this position for 5 seconds  Slowly move back up to a standing position  Repeat 10 times  · Curl up:  Lie on your back with your knees bent and feet flat on the floor  Place your hands, palms down, underneath the curve in your lower back  Next, with your elbows on the floor, lift your shoulders and chest 2 to 3 inches  Keep your head in line with your shoulders  Hold this position for 5 seconds  When you can do this exercise without pain for 10 to 15 seconds, you may add a rotation  While your shoulders and chest are lifted off the ground, turn slightly to the left and hold  Repeat on the other side  · Bird dog:  Place your hands and knees on the floor  Keep your wrists directly below your shoulders and your knees directly below your hips  Pull your belly button in toward your spine  Do not flatten or arch your back   Tighten your abdominal muscles  Raise one arm straight out so that it is aligned with your head  Next, raise the leg opposite your arm  Hold this position for 15 seconds  Lower your arm and leg slowly and change sides  Do 5 sets  © Copyright [address] Information is for End User's use only and may not be sold, redistributed or otherwise used for commercial purposes  All illustrations and images included in CareNotes® are the copyrighted property of A D A M , Inc  or [facility] Jamshid Barboza   The above information is an  only  It is not intended as medical advice for individual conditions or treatments  Talk to your doctor, nurse or pharmacist before following any medical regimen to see if it is safe and effective for you

## 2021-01-26 NOTE — PROGRESS NOTES
Assessment:  1  Chronic pain syndrome    2  Lumbar radiculopathy    3  Lumbar disc herniation        Plan:  Jelena Omalley is a 31 y o  male who was last seen 12/30/2020 presents for a follow up office visit in regards to chronic pain syndrome secondary to lumbar radiculopathy, lumbar disc herniation  As the patient experienced minimal relief with the right L4-L5 transforaminal epidural steroid injection at the last visit, and reports minimal improvement despite ice/heat, rest, oral steroids, and neuropathic pain medications, I believe it reasonable to move forward with repeating the lumbar MRI for further evaluation of back pain etiology  An order was placed for lumbar MRI  The patient was amenable to physical therapy in conjunction with any other pain management interventions that are deemed appropriate after lumbar MRI  A referral was placed for physical therapy and home exercises were provided for the patient in the interim  The patient will follow up after lumbar MRI and in 6 weeks for reevaluation or sooner if symptoms worsen in the interim  The patient was agreeable and verbalized understanding  My impressions and treatment recommendations were discussed in detail with the patient who verbalized understanding and had no further questions  Discharge instructions were provided  I personally saw and examined the patient and I agree with the above discussed plan of care  Orders Placed This Encounter   Procedures    Ambulatory referral to Physical Therapy     Standing Status:   Future     Standing Expiration Date:   1/26/2022     Referral Priority:   Routine     Referral Type:   Physical Therapy     Referral Reason:   Specialty Services Required     Requested Specialty:   Physical Therapy     Number of Visits Requested:   1     Expiration Date:   1/26/2022     No orders of the defined types were placed in this encounter        History of Present Illness:  Jelena Omalley is a 31 y o  male who was last seen 12/30/2020 presents for a follow up office visit in regards to chronic pain syndrome secondary to lumbar radiculopathy, lumbar disc herniation  The patients current symptoms include Back Pain, Hip Pain (Right), and Leg Pain (RLE)  The patient currently reports ongoing low back pain that is unchanged since last office visit  At the last visit, the patient underwent right L4-L5 transforaminal epidural steroid injection, which the patient reports provided 10-15% relief of his low back pain symptoms  At this time, the patient continues with right-sided low back pain that is constant, but worse in the morning and at night  The patient describes the pain as sharp, throbbing, pressure-like pain with numbness that radiates into his right lower extremity to his right foot  The patient reports muscle weakness to the right lower extremity, but denies bowel or bladder dysfunction, and reports he is able to complete ADLs with minimal difficulty  The patient currently rates his pain as 6/10 on the numeric rating scale  I have personally reviewed and/or updated the patient's past medical history, past surgical history, family history, social history, current medications, allergies, and vital signs today  Review of Systems   Constitutional: Negative for fever and unexpected weight change  HENT: Negative for trouble swallowing  Eyes: Negative for visual disturbance  Respiratory: Negative for shortness of breath and wheezing  Cardiovascular: Negative for chest pain and palpitations  Gastrointestinal: Negative for constipation, diarrhea, nausea and vomiting  Endocrine: Negative for cold intolerance, heat intolerance and polydipsia  Genitourinary: Negative for difficulty urinating and frequency  Musculoskeletal: Positive for back pain, gait problem and joint swelling  Negative for arthralgias and myalgias  Right Hip and RLE Pain   Skin: Negative for rash     Neurological: Positive for weakness  Negative for dizziness, seizures, syncope and headaches  Hematological: Does not bruise/bleed easily  Psychiatric/Behavioral: Negative for dysphoric mood  All other systems reviewed and are negative  Patient Active Problem List   Diagnosis    Herniation of lumbar intervertebral disc with radiculopathy       History reviewed  No pertinent past medical history  Past Surgical History:   Procedure Laterality Date    KNEE ARTHROSCOPY W/ ACL RECONSTRUCTION Right        History reviewed  No pertinent family history  Social History     Occupational History    Not on file   Tobacco Use    Smoking status: Current Every Day Smoker     Packs/day: 1 00     Years: 10 00     Pack years: 10 00     Types: Cigarettes    Smokeless tobacco: Never Used   Substance and Sexual Activity    Alcohol use: Yes    Drug use: Yes     Types: Marijuana    Sexual activity: Not on file       No current outpatient medications on file prior to visit  No current facility-administered medications on file prior to visit  No Known Allergies    Physical Exam:    /78   Pulse 104   Resp 18   Ht 6' (1 829 m)   Wt 94 8 kg (209 lb)   BMI 28 35 kg/m²     Constitutional:normal, well developed, well nourished, alert, in no distress and non-toxic and no overt pain behavior  Eyes:anicteric  HEENT:grossly intact  Neck:supple, symmetric, trachea midline and no masses   Pulmonary:even and unlabored  Cardiovascular:No edema or pitting edema present  Skin:Normal without rashes or lesions and well hydrated  Psychiatric:Mood and affect appropriate  Neurologic:Cranial Nerves II-XII grossly intact  Musculoskeletal:normal     Lumbar Spine Exam    Appearance:  Normal lordosis  Palpation/Tenderness:  right lumbar paraspinal tenderness  Range of Motion:  Flexion:   Moderately limited  with pain  Extension:  No limitation  without pain  Lateral Flexion - Left:  No limitation  without pain  Lateral Flexion - Right: Moderately limited  with pain  Motor Strength:  Left hip flexion:  5/5  Left hip extension:  5/5  Right hip flexion:  5/5  Right hip extension:  5/5  Left knee flexion:  5/5  Left knee extension:  5/5  Right knee flexion:  5/5  Right knee extension:  5/5  Left foot dorsiflexion:  5/5  Left foot plantar flexion:  5/5  Right foot dorsiflexion:  4/5  Right foot plantar flexion:  4/5  Special Tests:  Left Straight Leg Test:  negative  Right Straight Leg Test:  positive    Imaging

## 2021-01-27 ENCOUNTER — TRANSCRIBE ORDERS (OUTPATIENT)
Dept: PAIN MEDICINE | Facility: CLINIC | Age: 31
End: 2021-01-27

## 2021-08-12 ENCOUNTER — HOSPITAL ENCOUNTER (EMERGENCY)
Facility: HOSPITAL | Age: 31
Discharge: HOME | End: 2021-08-12
Attending: EMERGENCY MEDICINE
Payer: COMMERCIAL

## 2021-08-12 ENCOUNTER — TELEPHONE (OUTPATIENT)
Dept: PAIN MEDICINE | Facility: CLINIC | Age: 31
End: 2021-08-12

## 2021-08-12 VITALS
OXYGEN SATURATION: 100 % | RESPIRATION RATE: 18 BRPM | SYSTOLIC BLOOD PRESSURE: 136 MMHG | HEART RATE: 67 BPM | DIASTOLIC BLOOD PRESSURE: 79 MMHG | TEMPERATURE: 98.2 F

## 2021-08-12 DIAGNOSIS — G89.29 ACUTE EXACERBATION OF CHRONIC LOW BACK PAIN: Primary | ICD-10-CM

## 2021-08-12 DIAGNOSIS — M54.50 ACUTE EXACERBATION OF CHRONIC LOW BACK PAIN: Primary | ICD-10-CM

## 2021-08-12 PROCEDURE — 20552 NJX 1/MLT TRIGGER POINT 1/2: CPT | Performed by: EMERGENCY MEDICINE

## 2021-08-12 PROCEDURE — 99284 EMERGENCY DEPT VISIT MOD MDM: CPT | Performed by: EMERGENCY MEDICINE

## 2021-08-12 PROCEDURE — 99283 EMERGENCY DEPT VISIT LOW MDM: CPT

## 2021-08-12 RX ORDER — CYCLOBENZAPRINE HCL 10 MG
10 TABLET ORAL
Qty: 10 TABLET | Refills: 0 | Status: SHIPPED | OUTPATIENT
Start: 2021-08-12 | End: 2021-08-22

## 2021-08-12 RX ORDER — BUPIVACAINE HYDROCHLORIDE 5 MG/ML
10 INJECTION, SOLUTION EPIDURAL; INTRACAUDAL ONCE
Status: COMPLETED | OUTPATIENT
Start: 2021-08-12 | End: 2021-08-12

## 2021-08-12 RX ORDER — OXYCODONE HYDROCHLORIDE 5 MG/1
5 TABLET ORAL ONCE
Status: COMPLETED | OUTPATIENT
Start: 2021-08-12 | End: 2021-08-12

## 2021-08-12 RX ORDER — CYCLOBENZAPRINE HCL 10 MG
5 TABLET ORAL ONCE
Status: COMPLETED | OUTPATIENT
Start: 2021-08-12 | End: 2021-08-12

## 2021-08-12 RX ORDER — LIDOCAINE HYDROCHLORIDE AND EPINEPHRINE 10; 10 MG/ML; UG/ML
5 INJECTION, SOLUTION INFILTRATION; PERINEURAL ONCE
Status: COMPLETED | OUTPATIENT
Start: 2021-08-12 | End: 2021-08-12

## 2021-08-12 RX ADMIN — CYCLOBENZAPRINE HYDROCHLORIDE 5 MG: 10 TABLET, FILM COATED ORAL at 15:53

## 2021-08-12 RX ADMIN — BUPIVACAINE HYDROCHLORIDE 10 ML: 5 INJECTION, SOLUTION EPIDURAL; INTRACAUDAL at 15:56

## 2021-08-12 RX ADMIN — LIDOCAINE HYDROCHLORIDE AND EPINEPHRINE 5 ML: 10; 10 INJECTION, SOLUTION INFILTRATION; PERINEURAL at 15:56

## 2021-08-12 RX ADMIN — OXYCODONE HYDROCHLORIDE 5 MG: 5 TABLET ORAL at 15:53

## 2021-08-12 NOTE — ED PROVIDER NOTES
History  Chief Complaint   Patient presents with    Back Pain     right sided back pain, has appt for comprehensive spine program on 59     36year-old male with past medical history of chronic low back pain due to L4-L5 radiculopathy presents for evaluation of exacerbation of low back pain that started around 10:00 p m  Last night  Patient reports he often is able to relieve pain somewhat after napping however was unable to improved pain at this time  Patient reports he has a standing appointment with a comprehensive spine specialist on 09/07/2021  Patient reports pain is similar to baseline exacerbations described as low back pain shooting into right leg causes numbness of right leg  Pain is worse with movement with minimal relieving factors  Patient denies any fevers, cough/cold symptoms, any new weakness, bowel/bladder dysfunction, chest pain, abdominal pain, N/V/D or any other symptoms  Patient denies any recent injuries  No other concerns  None       History reviewed  No pertinent past medical history  Past Surgical History:   Procedure Laterality Date    KNEE ARTHROSCOPY W/ ACL RECONSTRUCTION Right        History reviewed  No pertinent family history  I have reviewed and agree with the history as documented  E-Cigarette/Vaping    E-Cigarette Use Never User      E-Cigarette/Vaping Substances    Nicotine No     THC No     CBD No     Flavoring No     Other No     Unknown No      Social History     Tobacco Use    Smoking status: Current Every Day Smoker     Packs/day: 1 00     Years: 10 00     Pack years: 10 00     Types: Cigarettes    Smokeless tobacco: Never Used   Vaping Use    Vaping Use: Never used   Substance Use Topics    Alcohol use: Yes     Comment: occ    Drug use: Yes     Types: Marijuana        Review of Systems   Constitutional: Negative for chills and fever  HENT: Negative  Eyes: Negative  Respiratory: Negative  Negative for shortness of breath  Cardiovascular: Negative  Negative for chest pain  Gastrointestinal: Negative  Negative for abdominal pain, diarrhea, nausea and vomiting  Endocrine: Negative  Genitourinary: Negative  Negative for decreased urine volume, difficulty urinating and enuresis  Musculoskeletal: Positive for back pain  Allergic/Immunologic: Negative  Neurological: Negative  Hematological: Negative  Psychiatric/Behavioral: Negative  All other systems reviewed and are negative  Physical Exam  ED Triage Vitals   Temperature Pulse Respirations Blood Pressure SpO2   08/12/21 1308 08/12/21 1307 08/12/21 1307 08/12/21 1307 08/12/21 1307   98 2 °F (36 8 °C) 67 18 136/79 100 %      Temp Source Heart Rate Source Patient Position - Orthostatic VS BP Location FiO2 (%)   08/12/21 1308 -- -- -- --   Oral          Pain Score       08/12/21 1553       Worst Possible Pain             Orthostatic Vital Signs  Vitals:    08/12/21 1307   BP: 136/79   Pulse: 67       Physical Exam  Constitutional:       General: He is in acute distress  Appearance: Normal appearance  He is normal weight  HENT:      Head: Normocephalic and atraumatic  Right Ear: External ear normal       Left Ear: External ear normal       Nose: Nose normal    Eyes:      Extraocular Movements: Extraocular movements intact  Cardiovascular:      Rate and Rhythm: Normal rate and regular rhythm  Pulses: Normal pulses  Heart sounds: Normal heart sounds  Pulmonary:      Effort: Pulmonary effort is normal  No respiratory distress  Breath sounds: Normal breath sounds  No wheezing  Abdominal:      General: Abdomen is flat  There is no distension  Palpations: Abdomen is soft  Tenderness: There is no abdominal tenderness  There is no guarding or rebound  Musculoskeletal:         General: Tenderness present  No deformity or signs of injury  Cervical back: Normal range of motion        Comments: No step-offs, tenderness to palpation of right lumbar spine at L4 level with no overlying bruising or streaking  Evidence of muscle spasm to this area  No other step-offs or paraspinal tenderness throughout spine  Neurological:      General: No focal deficit present  Mental Status: He is alert and oriented to person, place, and time  Psychiatric:         Mood and Affect: Mood normal          Behavior: Behavior normal          ED Medications  Medications   oxyCODONE (ROXICODONE) IR tablet 5 mg (5 mg Oral Given 8/12/21 7633)   cyclobenzaprine (FLEXERIL) tablet 5 mg (5 mg Oral Given 8/12/21 1863)   lidocaine-epinephrine (XYLOCAINE/EPINEPHRINE) 1 %-1:100,000 injection 5 mL (5 mL Infiltration Given by Other 8/12/21 9246)   bupivacaine (PF) (MARCAINE) 0 5 % injection 10 mL (10 mL Injection Given by Other 8/12/21 1356)       Diagnostic Studies  Results Reviewed     None                 No orders to display         Procedures  Procedures      ED Course  ED Course as of Aug 20 1454   Thu Aug 12, 2021   1536 Spoke to patient about local anesthetic options of trigger point injection and lidocaine patch  Pt would like the trigger point injection at this time  No other concerns  MDM  Number of Diagnoses or Management Options  Acute exacerbation of chronic low back pain  Diagnosis management comments: 73-year-old male with past medical history of chronic low back pain due to L4-L5 radiculopathy presents for evaluation of exacerbation of low back pain that started around 10:00 p m  Last night  Pt remained stable throughout ED course  Agreed to trigger point injections which pt tolerated well  Pt reported back pain was improved and was able to ambulate well in ED before d/c to home  Agrees to plan to d/c to home with Rx flexeril and pain medications and to present to standing appointment with comprehensive spine specialist  All questions answered  No other concerns      Risk of Complications, Morbidity, and/or Mortality  Presenting problems: moderate  Diagnostic procedures: moderate  Management options: moderate    Patient Progress  Patient progress: improved      Disposition  Final diagnoses:   Acute exacerbation of chronic low back pain     Time reflects when diagnosis was documented in both MDM as applicable and the Disposition within this note     Time User Action Codes Description Comment    8/12/2021  4:43 PM Sarahi Jaimes [M54 5,  G89 29] Acute exacerbation of chronic low back pain       ED Disposition     ED Disposition Condition Date/Time Comment    Discharge Stable Thu Aug 12, 2021  4:45 PM Shyann Maico discharge to home/self care  Follow-up Information     Follow up With Specialties Details Why Contact Info Additional [address], [place] Internal Medicine In 2 days As needed for follow up appointment regarding today's ER presentation  [facility] Emergency Department Emergency Medicine Go to  If symptoms worsen, if you begin having new low back pain, fevers, difficulty with control/function of bowel/bladder  [address] [facility] [id number] [place] [address] Emergency Department, [address], [place], [place], [address]    Please present for scheduled appointment to comprehensive spine center on 9/7/21         [address] [place] [address] Pain Medicine Call  As needed for further pain management   [address], [address] [place] [address] Ta[address], [address] [phone]          Discharge Medication List as of 8/12/2021  4:45 PM      START taking these medications    Details   cyclobenzaprine (FLEXERIL) 10 mg tablet Take 1 tablet (10 mg total) by mouth daily at bedtime as needed for muscle spasms for up to 10 days, Starting Thu 8/12/2021, Until Sun 8/22/2021 at 2359, Normal           No discharge procedures on file  PDMP Review       Value Time User    PDMP Reviewed  Yes 8/13/2021  2:08 PM Candi Yousif, [address]           ED Provider  Attending physically available and evaluated Jazmine Bernabe  I managed the patient along with the ED Attending      Electronically Signed by         Chuy Dai MD  08/20/21 2017

## 2021-08-12 NOTE — TELEPHONE ENCOUNTER
Clerical - Please reach out to pt to assist with scheduling, thank you  [address] [place], DEA note started in Epic

## 2021-08-12 NOTE — TELEPHONE ENCOUNTER
Pts mom called in stating that the patient is in ED and they are asking for sooner appmt    Thank you      [phone]

## 2021-08-13 ENCOUNTER — TELEPHONE (OUTPATIENT)
Dept: PHYSICAL THERAPY | Facility: OTHER | Age: 31
End: 2021-08-13

## 2021-08-13 ENCOUNTER — HOSPITAL ENCOUNTER (EMERGENCY)
Facility: HOSPITAL | Age: 31
Discharge: HOME | End: 2021-08-13
Attending: EMERGENCY MEDICINE | Admitting: EMERGENCY MEDICINE
Payer: COMMERCIAL

## 2021-08-13 ENCOUNTER — OFFICE VISIT (OUTPATIENT)
Dept: PAIN MEDICINE | Facility: CLINIC | Age: 31
End: 2021-08-13
Payer: COMMERCIAL

## 2021-08-13 VITALS
OXYGEN SATURATION: 98 % | HEIGHT: 72 IN | DIASTOLIC BLOOD PRESSURE: 79 MMHG | BODY MASS INDEX: 26.41 KG/M2 | RESPIRATION RATE: 18 BRPM | SYSTOLIC BLOOD PRESSURE: 141 MMHG | TEMPERATURE: 98 F | HEART RATE: 71 BPM | WEIGHT: 195 LBS

## 2021-08-13 VITALS
HEART RATE: 114 BPM | SYSTOLIC BLOOD PRESSURE: 119 MMHG | BODY MASS INDEX: 26.45 KG/M2 | DIASTOLIC BLOOD PRESSURE: 89 MMHG | WEIGHT: 195 LBS

## 2021-08-13 DIAGNOSIS — M51.16 HERNIATION OF LUMBAR INTERVERTEBRAL DISC WITH RADICULOPATHY: ICD-10-CM

## 2021-08-13 DIAGNOSIS — G89.29 ACUTE EXACERBATION OF CHRONIC LOW BACK PAIN: ICD-10-CM

## 2021-08-13 DIAGNOSIS — M54.9 BACK PAIN: Primary | ICD-10-CM

## 2021-08-13 DIAGNOSIS — G89.4 CHRONIC PAIN SYNDROME: Primary | ICD-10-CM

## 2021-08-13 DIAGNOSIS — M54.50 ACUTE EXACERBATION OF CHRONIC LOW BACK PAIN: ICD-10-CM

## 2021-08-13 DIAGNOSIS — M51.16 LUMBAR DISC DISEASE WITH RADICULOPATHY: ICD-10-CM

## 2021-08-13 PROCEDURE — 96375 TX/PRO/DX INJ NEW DRUG ADDON: CPT

## 2021-08-13 PROCEDURE — 99283 EMERGENCY DEPT VISIT LOW MDM: CPT

## 2021-08-13 PROCEDURE — 99214 OFFICE O/P EST MOD 30 MIN: CPT | Performed by: NURSE PRACTITIONER

## 2021-08-13 PROCEDURE — 99284 EMERGENCY DEPT VISIT MOD MDM: CPT | Performed by: STUDENT IN AN ORGANIZED HEALTH CARE EDUCATION/TRAINING PROGRAM

## 2021-08-13 PROCEDURE — 96372 THER/PROPH/DIAG INJ SC/IM: CPT

## 2021-08-13 PROCEDURE — 96374 THER/PROPH/DIAG INJ IV PUSH: CPT

## 2021-08-13 RX ORDER — OXYCODONE HYDROCHLORIDE 5 MG/1
5 TABLET ORAL 2 TIMES DAILY PRN
Qty: 10 TABLET | Refills: 0 | Status: SHIPPED | OUTPATIENT
Start: 2021-08-13

## 2021-08-13 RX ORDER — MORPHINE SULFATE 4 MG/ML
4 INJECTION, SOLUTION INTRAMUSCULAR; INTRAVENOUS ONCE
Status: COMPLETED | OUTPATIENT
Start: 2021-08-13 | End: 2021-08-13

## 2021-08-13 RX ORDER — METHYLPREDNISOLONE 4 MG/1
TABLET ORAL
Qty: 1 EACH | Refills: 0 | Status: SHIPPED | OUTPATIENT
Start: 2021-08-13 | End: 2021-09-09 | Stop reason: ALTCHOICE

## 2021-08-13 RX ORDER — LIDOCAINE 50 MG/G
1 PATCH TOPICAL ONCE
Status: DISCONTINUED | OUTPATIENT
Start: 2021-08-13 | End: 2021-08-13 | Stop reason: HOSPADM

## 2021-08-13 RX ORDER — IBUPROFEN 600 MG/1
600 TABLET ORAL EVERY 6 HOURS PRN
Qty: 30 TABLET | Refills: 0 | Status: SHIPPED | OUTPATIENT
Start: 2021-08-13 | End: 2021-08-23

## 2021-08-13 RX ORDER — KETOROLAC TROMETHAMINE 30 MG/ML
30 INJECTION, SOLUTION INTRAMUSCULAR; INTRAVENOUS ONCE
Status: COMPLETED | OUTPATIENT
Start: 2021-08-13 | End: 2021-08-13

## 2021-08-13 RX ORDER — DIAZEPAM 5 MG/ML
5 INJECTION, SOLUTION INTRAMUSCULAR; INTRAVENOUS ONCE
Status: COMPLETED | OUTPATIENT
Start: 2021-08-13 | End: 2021-08-13

## 2021-08-13 RX ORDER — DEXAMETHASONE SODIUM PHOSPHATE 10 MG/ML
10 INJECTION, SOLUTION INTRAMUSCULAR; INTRAVENOUS ONCE
Status: COMPLETED | OUTPATIENT
Start: 2021-08-13 | End: 2021-08-13

## 2021-08-13 RX ADMIN — MORPHINE SULFATE 4 MG: 4 INJECTION INTRAVENOUS at 02:10

## 2021-08-13 RX ADMIN — DIAZEPAM 5 MG: 5 INJECTION, SOLUTION INTRAMUSCULAR; INTRAVENOUS at 01:08

## 2021-08-13 RX ADMIN — LIDOCAINE 5% 1 PATCH: 700 PATCH TOPICAL at 01:10

## 2021-08-13 RX ADMIN — KETOROLAC TROMETHAMINE 30 MG: 30 INJECTION, SOLUTION INTRAMUSCULAR; INTRAVENOUS at 01:06

## 2021-08-13 RX ADMIN — DEXAMETHASONE SODIUM PHOSPHATE 10 MG: 10 INJECTION, SOLUTION INTRAMUSCULAR; INTRAVENOUS at 01:07

## 2021-08-13 NOTE — TELEPHONE ENCOUNTER
Pts mom called again  She said pt is in tears, I found a cancellation for 1:45p today with [employer]- ok by SUZANNE Eaton Reason to add pt to schedule today

## 2021-08-13 NOTE — DISCHARGE INSTRUCTIONS
Continue or alternate between tylenol and ibuprofen as needed for pain relief  Follow up with comprehensive spine program for further evaluation of chronic pain  Follow up with primary care as needed  Return to the ED with worsening symptoms including development of fevers or chills, bowel or bladder incontinence, urinary retention, weakness in the lower extremities

## 2021-08-13 NOTE — ED PROVIDER NOTES
History  Chief Complaint   Patient presents with    Back Pain     seen at [facility] this AM for back pain, given lidocaine injections and oxy, states injections made pain worse, denies urinary symptoms, back pain x 2days  Patient 27-year-old male who presents ED today with complaint of back pain approximately 2 days  Patient states he has a history of chronic back pain with 2 herniated discs in his back and currently follows with a spinal doctor for pain management but states he has been developing worsening back pain over the past 2 days it has continued to worsen  He states he went to [facility] where he was given a lidocaine patch, oxycodone, and lidocaine injections into his lumbar spine and states he has been in significantly worse pain following the lidocaine injections  Patient rates his pain 10/10, constant, described as a sharp radiate pain that shoots down his left leg  Patient states he has been trying over-the-counter ibuprofen and Tylenol with little relief  Denies fever/chills, urinary retention, bowel or bladder incontinence, saddle anesthesia, weakness in lower extremities  Prior to Admission Medications   Prescriptions Last Dose Informant Patient Reported? Taking? cyclobenzaprine (FLEXERIL) 10 mg tablet Not Taking at Unknown time  No No   Sig: Take 1 tablet (10 mg total) by mouth daily at bedtime as needed for muscle spasms for up to 10 days   Patient not taking: Reported on 8/13/2021      Facility-Administered Medications: None       History reviewed  No pertinent past medical history  Past Surgical History:   Procedure Laterality Date    KNEE ARTHROSCOPY W/ ACL RECONSTRUCTION Right        History reviewed  No pertinent family history  I have reviewed and agree with the history as documented      E-Cigarette/Vaping    E-Cigarette Use Never User      E-Cigarette/Vaping Substances    Nicotine No     THC No     CBD No     Flavoring No     Other No     Unknown No      Social History     Tobacco Use    Smoking status: Current Every Day Smoker     Packs/day: 1 00     Years: 10 00     Pack years: 10 00     Types: Cigarettes    Smokeless tobacco: Never Used   Vaping Use    Vaping Use: Never used   Substance Use Topics    Alcohol use: Yes     Comment: occ    Drug use: Yes     Types: Marijuana       Review of Systems   Constitutional: Negative for chills and fever  HENT: Negative for ear pain and sore throat  Eyes: Negative for pain and visual disturbance  Respiratory: Negative for cough and shortness of breath  Cardiovascular: Negative for chest pain and palpitations  Gastrointestinal: Negative for abdominal pain and vomiting  Genitourinary: Negative for dysuria and hematuria  Musculoskeletal: Positive for back pain  Negative for arthralgias  Skin: Negative for color change and rash  Neurological: Negative for seizures, syncope, facial asymmetry, weakness, light-headedness and numbness  All other systems reviewed and are negative  Physical Exam  Physical Exam  Vitals and nursing note reviewed  Constitutional:       Appearance: He is well-developed  HENT:      Head: Normocephalic and atraumatic  Eyes:      Conjunctiva/sclera: Conjunctivae normal    Cardiovascular:      Rate and Rhythm: Normal rate and regular rhythm  Pulses:           Dorsalis pedis pulses are 2+ on the right side and 2+ on the left side  Posterior tibial pulses are 2+ on the right side and 2+ on the left side  Heart sounds: No murmur heard  Pulmonary:      Effort: Pulmonary effort is normal  No respiratory distress  Breath sounds: Normal breath sounds  Abdominal:      Palpations: Abdomen is soft  Tenderness: There is no abdominal tenderness  There is no right CVA tenderness or left CVA tenderness  Musculoskeletal:      Cervical back: Normal and neck supple  Thoracic back: Normal       Lumbar back: Tenderness present   No swelling, edema or bony tenderness  Decreased range of motion  Back:       Right lower leg: No edema  Left lower leg: No edema  Skin:     General: Skin is warm and dry  Neurological:      Mental Status: He is alert  Sensory: Sensation is intact  No sensory deficit  Motor: Motor function is intact  No weakness  Comments: Muscle strength 5/5 in full sensations to light touch felt in lower extremity bilaterally  Patient is neurovascularly intact     Psychiatric:         Mood and Affect: Mood normal          Speech: Speech normal          Behavior: Behavior normal  Behavior is cooperative  Thought Content:  Thought content normal          Vital Signs  ED Triage Vitals [08/13/21 0036]   Temperature Pulse Respirations Blood Pressure SpO2   98 °F (36 7 °C) 71 18 141/79 98 %      Temp Source Heart Rate Source Patient Position - Orthostatic VS BP Location FiO2 (%)   Oral Monitor Lying Left arm --      Pain Score       Worst Possible Pain           Vitals:    08/13/21 0036   BP: 141/79   Pulse: 71   Patient Position - Orthostatic VS: Lying         Visual Acuity      ED Medications  Medications   ketorolac (TORADOL) injection 30 mg (30 mg Intravenous Given 8/13/21 0106)   dexamethasone (PF) (DECADRON) injection 10 mg (10 mg Intravenous Given 8/13/21 0107)   diazepam (VALIUM) injection 5 mg (5 mg Intramuscular Given 8/13/21 0108)   morphine (PF) 4 mg/mL injection 4 mg (4 mg Intravenous Given 8/13/21 0210)       Diagnostic Studies  Results Reviewed     None                 No orders to display              Procedures  Procedures         ED Course                                           MDM  Number of Diagnoses or Management Options  Back pain  Diagnosis management comments: Patient 33-year-old male presents ED today with complaint of back pain approximately 2 days  Examination showed tenderness palpation of the right paraspinal muscles extending into the right glute, full sensation to light touch felt lower extremities bilaterally, muscle strength 5/5 in lower extremities bilaterally; remainder exam was unremarkable    Suspected acute on chronic back pain due to history of multiple herniated discs  Patient initially given lidocaine patch, Valium, Toradol, Depo-Medrol with little relief  - patient was then given IV morphine with good relief    Patient was discharged home with referral to comprehensive spinal program  Advised to alternate between Tylenol and ibuprofen every 6 hours as needed for pain relief  Patient advised to follow-up with spine specialist for further evaluation  Return ED with worsening symptoms as discussed the patient  Patient stable upon discharge    Patient Progress  Patient progress: stable      Disposition  Final diagnoses:   Back pain     Time reflects when diagnosis was documented in both MDM as applicable and the Disposition within this note     Time User Action Codes Description Comment    8/13/2021  2:26 AM Scar Pineda Add [M54 9] Back pain       ED Disposition     ED Disposition Condition Date/Time Comment    Discharge Stable Fri Aug 13, 2021  2:26 AM Cristin Hartman discharge to home/self care              Follow-up Information     Follow up With Specialties Details Why Contact Info Additional Information    Jefe Avila MD Internal Medicine Schedule an appointment as soon as possible for a visit  As needed [address] Emergency Department Emergency Medicine Go to  As needed, If symptoms worsen [address] [facility],[address] [id number]  [facility] Emergency Department, [address], [address]          Discharge Medication List as of 8/13/2021  2:30 AM      START taking these medications    Details   ibuprofen (MOTRIN) 600 mg tablet Take 1 tablet (600 mg total) by mouth every 6 (six) hours as needed for mild pain or moderate pain for up to 10 days, Starting Fri 8/13/2021, Until Mon 8/23/2021 at 2359, Normal         CONTINUE these medications which have NOT CHANGED    Details   cyclobenzaprine (FLEXERIL) 10 mg tablet Take 1 tablet (10 mg total) by mouth daily at bedtime as needed for muscle spasms for up to 10 days, Starting Thu 8/12/2021, Until Sun 8/22/2021 at 2359, Normal               PDMP Review     None          ED Provider  Electronically Signed by           Veronica Mckeon PA-C  08/13/21 4877

## 2021-08-13 NOTE — TELEPHONE ENCOUNTER
Nurse reached out to discuss recent referral entered for  [facility] program and offerings  Nurse reviewed triage and evaluation with him as well as recent ED visits etc     Patient declined to participate in the program at this time  Patient is scheduled [facility]  Nurse confirmed patient has [facility] contact information and encouraged to call program back if needed in the future  Patient agreed and very appreciative of call and discussion  Nurse wished him well and referral closed per protocol

## 2021-08-13 NOTE — PATIENT INSTRUCTIONS
Epidural Steroid Injection   AMBULATORY CARE:   What you need to know about an epidural steroid injection (ROD):  An ROD is a procedure to inject steroid medicine into the epidural space  The epidural space is between your spinal cord and vertebrae  Steroids reduce inflammation and fluid buildup in your spine that may be causing pain  You may be given pain medicine along with the steroids  How to prepare for an ROD:  Your healthcare provider will talk to you about how to prepare for your procedure  He or she will tell you what medicines to take or not take on the day of your procedure  You may need to stop taking blood thinners or other medicines several days before your procedure  You may need to adjust any diabetes medicine you take on the day of your procedure  Steroid medicine can increase your blood sugar level  Arrange for someone to drive you home when you are discharged  What will happen during an ROD:   · You will be given medicine to numb the procedure area  You will be awake for the procedure, but you will not feel pain  You may also be given medicine to help you relax  Contrast liquid will be used to help your healthcare provider see the area better  Tell the healthcare provider if you have ever had an allergic reaction to contrast liquid  · Your healthcare provider may place the needle into your neck area, middle of your back, or tailbone area  He may inject the medicine next to the nerves that are causing your pain  He may instead inject the medicine into a larger area of the epidural space  This helps the medicine spread to more nerves  Your healthcare provider will use a fluoroscope to help guide the needle to the right place  A fluoroscope is a type of x-ray  After the procedure, a bandage will be placed over the injection site to prevent infection  What will happen after an ROD:  You will have a bandage over the injection site to prevent infection   Your healthcare provider will tell you when you can bathe and any activity guidelines  You will be able to go home  Risks of an ROD:  You may have temporary or permanent nerve damage or paralysis  You may have bleeding or develop a serious infection, such as meningitis (swelling of the brain coverings)  An abscess may also develop  An abscess is a pus-filled area under the skin  You may need surgery to fix the abscess  You may have a seizure, anxiety, or trouble sleeping  If you are a man, you may have temporary erectile dysfunction (not able to have an erection)  Call your local emergency number (911 in the [address] [place] [address],[address]) if:   · You have a seizure  · You have trouble moving your legs  Seek care immediately if:   · Blood soaks through your bandage  · You have a fever or chills, severe back pain, and the procedure area is sensitive to the touch  · You cannot control when you urinate or have a bowel movement  Call your doctor if:   · You have weakness or numbness in your legs  · Your wound is red, swollen, or draining pus  · You have nausea or are vomiting  · Your face or neck is red and you feel warm  · You have more pain than you had before the procedure  · You have swelling in your hands or feet  · You have questions or concerns about your condition or care  Care for your wound as directed: You may remove the bandage before you go to bed the day of your procedure  You may take a shower, but do not take a bath for at least 24 hours  Self-care:   · Do not drive,  use machines, or do strenuous activity for 24 hours after your procedure or as directed  · Continue other treatments  as directed  Steroid injections alone will not control your pain  The injections are meant to be used with other treatments, such as physical therapy  Follow up with your doctor as directed:  Write down your questions so you remember to ask them during your visits     © Copyright [email or web address] 2021 Information is for End User's use only and may not be sold, redistributed or otherwise used for commercial purposes  All illustrations and images included in CareNotes® are the copyrighted property of [employer]  or [employer]  The above information is an  only  It is not intended as medical advice for individual conditions or treatments  Talk to your doctor, nurse or pharmacist before following any medical regimen to see if it is safe and effective for you

## 2021-08-13 NOTE — PROGRESS NOTES
Assessment:  1  Chronic pain syndrome    2  Acute exacerbation of chronic low back pain    3  Lumbar disc disease with radiculopathy    4  Herniation of lumbar intervertebral disc with radiculopathy        Plan:  Cristin Hartman is a 36 y o  male who was last seen 1/26/2021  presents for a follow up office visit in regards to chronic pain syndrome secondary to low back pain, lumbar disc herniation  The patient is interested in repeating the epidural steroid injection to help with his back pain symptoms, as he experienced greater than 50% relief in his pain symptoms for 7 months with the last procedure  The most common risk of the procedure were reviewed with the patient, and an order was placed for right L4-L5 transforaminal epidural steroid injection  Complete risks and benefits including bleeding, infection, tissue reaction, nerve injury and allergic reaction were discussed  The approach was demonstrated using models and literature was provided  Verbal and written consent was obtained  I discussed with the patient that at this point since I feel there is a significant inflammatory component to his pain symptoms, he would benefit from a titrating dose oral steroids over the next 7 days  I advised the patient that while on the steroids, he should not take any other oral NSAIDs but may continue with cyclobenzaprine and Tylenol until he has completed the steroid taper, at which time he may resume NSAIDs  I also advised the patient once he has completed the steroid taper, he is to give our office a follow-up phone call to let us know how he is doing and if there is any improvement  The patient was agreeable and verbalized understanding  I will also provide the patient with oxycodone 5 mg by mouth twice daily to be taking sparingly  The patient was provided with #10 tablets    There are risks associated with opioid medications, including dependence, addiction and tolerance   The patient understands and agrees to use these medications only as prescribed  Potential side effects of the medications include, but are not limited to, constipation, drowsiness, addiction, impaired judgment and risk of fatal overdose if not taken as prescribed  The patient was warned against driving while taking sedation medications  Sharing medications is a felony  At this point in time, the patient is showing no signs of addiction, abuse, diversion or suicidal ideation  [place] Prescription Drug Monitoring Program report was reviewed and was appropriate     The patient will follow-up after right L4-L5 transforaminal epidural steroid injection or sooner if symptoms worsen in the interim  The patient was agreeable and verbalized understanding  My impressions and treatment recommendations were discussed in detail with the patient who verbalized understanding and had no further questions  Discharge instructions were provided  I personally saw and examined the patient and I agree with the above discussed plan of care  Orders Placed This Encounter   Procedures    FL spine and pain procedure     Standing Status:   Future     Standing Expiration Date:   8/13/2025     Order Specific Question:   Reason for Exam:     Answer:   right L4-L5 TFESI     Order Specific Question:   Anticoagulant hold needed? Answer:   no     New Medications Ordered This Visit   Medications    methylPREDNISolone 4 MG tablet therapy pack     Sig: Use as directed on package     Dispense:  1 each     Refill:  0    oxyCODONE (ROXICODONE) 5 mg immediate release tablet     Sig: Take 1 tablet (5 mg total) by mouth 2 (two) times a day as needed for moderate painMax Daily Amount: 10 mg     Dispense:  10 tablet     Refill:  0       History of Present Illness:  Kaley Radre is a 36 y o  male who was last seen 1/26/2021  presents for a follow up office visit in regards to chronic pain syndrome secondary to low back pain, lumbar disc herniation   The patients current symptoms include Back Pain and Leg Pain (right side)  Patient currently reports ongoing low back pain that is worse since the last office visit  The patient was seen in the emergency department yesterday and today for acute exacerbation of chronic low back pain symptoms  The patient reports he was given lidocaine injections as well as Valium to help with his pain symptoms with minimal relief  The patient describes his pain as constant, sharp, throbbing, cramping, pressure-like, shooting pain with numbness and pins and needles  Patient has a history of right L4-L5 transforaminal epidural steroid injection, which she reports provided significant relief his low back pain symptoms and lasted for greater than 7 months  At this time the patient continues with right-sided back pain that radiates into his right lower extremity  The patient reports increased pain with walking, sitting, and standing  The patient reports right lower extremity weakness but reports no bowel or bladder dysfunction, and is able to complete ADLs with minimal difficulty  The patient currently rates his pain as 10/10 on the numeric rating scale  The patient reports he was given both Dilaudid and morphine in the emergency department with positive relief  I have personally reviewed and/or updated the patient's past medical history, past surgical history, family history, social history, current medications, allergies, and vital signs today  Review of Systems   Respiratory: Negative for shortness of breath  Cardiovascular: Negative for chest pain  Gastrointestinal: Negative for constipation, diarrhea, nausea and vomiting  Musculoskeletal: Positive for back pain, gait problem and joint swelling  Negative for arthralgias and myalgias  Skin: Negative for rash  Neurological: Positive for weakness  Negative for dizziness and seizures  All other systems reviewed and are negative        Patient Active Problem List Diagnosis    Herniation of lumbar intervertebral disc with radiculopathy       History reviewed  No pertinent past medical history  Past Surgical History:   Procedure Laterality Date    KNEE ARTHROSCOPY W/ ACL RECONSTRUCTION Right        History reviewed  No pertinent family history      Social History     Occupational History    Not on file   Tobacco Use    Smoking status: Current Every Day Smoker     Packs/day: 1 00     Years: 10 00     Pack years: 10 00     Types: Cigarettes    Smokeless tobacco: Never Used   Vaping Use    Vaping Use: Never used   Substance and Sexual Activity    Alcohol use: Yes     Comment: occ    Drug use: Yes     Types: Marijuana    Sexual activity: Not on file       Current Outpatient Medications on File Prior to Visit   Medication Sig    cyclobenzaprine (FLEXERIL) 10 mg tablet Take 1 tablet (10 mg total) by mouth daily at bedtime as needed for muscle spasms for up to 10 days    ibuprofen (MOTRIN) 600 mg tablet Take 1 tablet (600 mg total) by mouth every 6 (six) hours as needed for mild pain or moderate pain for up to 10 days     Current Facility-Administered Medications on File Prior to Visit   Medication    [COMPLETED] bupivacaine (PF) (MARCAINE) 0 5 % injection 10 mL    [COMPLETED] cyclobenzaprine (FLEXERIL) tablet 5 mg    [COMPLETED] dexamethasone (PF) (DECADRON) injection 10 mg    [COMPLETED] diazepam (VALIUM) injection 5 mg    [COMPLETED] ketorolac (TORADOL) injection 30 mg    [COMPLETED] lidocaine-epinephrine (XYLOCAINE/EPINEPHRINE) 1 %-1:100,000 injection 5 mL    [COMPLETED] morphine (PF) 4 mg/mL injection 4 mg    [COMPLETED] oxyCODONE (ROXICODONE) IR tablet 5 mg    [DISCONTINUED] lidocaine (LIDODERM) 5 % patch 1 patch       No Known Allergies    Physical Exam:    /89   Pulse (!) 114   Wt 88 5 kg (195 lb)   BMI 26 45 kg/m²     Constitutional:normal, well developed, well nourished, alert, in no distress and non-toxic and no overt pain behavior    Eyes:anicteric  HEENT:grossly intact  Neck:supple, symmetric, trachea midline and no masses   Pulmonary:even and unlabored  Cardiovascular:No edema or pitting edema present  Skin:Normal without rashes or lesions and well hydrated  Psychiatric:Mood and affect appropriate  Neurologic:Cranial Nerves II-XII grossly intact  Musculoskeletal:ambulates with cane     Lumbar Spine Exam    Appearance:  Normal lordosis  Palpation/Tenderness:  right lumbar paraspinal tenderness  Range of Motion:  Flexion:  No limitation  without pain  Extension:  Moderately limited  with pain  Lateral Flexion - Left:  No limitation  without pain  Lateral Flexion - Right:  No limitation  without pain  Rotation - Left:  No limitation  without pain  Rotation - Right:  No limitation  without pain  Motor Strength:  Left hip flexion:  5/5  Left hip extension:  5/5  Right hip flexion:  5/5  Right hip extension:  5/5  Left knee flexion:  5/5  Left knee extension:  5/5  Right knee flexion:  5/5  Right knee extension:  5/5  Left foot dorsiflexion:  5/5  Left foot plantar flexion:  5/5  Right foot dorsiflexion:  5/5  Right foot plantar flexion:  5/5    Imaging none

## 2021-08-16 ENCOUNTER — TELEPHONE (OUTPATIENT)
Dept: PAIN MEDICINE | Facility: CLINIC | Age: 31
End: 2021-08-16

## 2021-08-16 NOTE — TELEPHONE ENCOUNTER
Scheduled pt for Rt L4 L5 Tfesi for 9/9/21  Pt denies rx blood thinners  Went over pre-procedure instructions below:  Nothing to eat or drink 1 hr prior to procedure  Need to arrange transportation  Proper clothing for procedure  If ill or placed on antibiotics please call to reschedule  Covid/travel/ and vaccine instructions

## 2021-08-22 NOTE — ED ATTENDING ATTESTATION
8/12/2021  Sreekanth WYMAN MD, saw and evaluated the patient  I have discussed the patient with the resident/non-physician practitioner and agree with the resident's/non-physician practitioner's findings, Plan of Care, and MDM as documented in the resident's/non-physician practitioner's note, except where noted  All available labs and Radiology studies were reviewed  I was present for key portions of any procedure(s) performed by the resident/non-physician practitioner and I was immediately available to provide assistance  See h and p above - agree with resident tx/care        At this point I agree with the current assessment done in the Emergency Department    I have conducted an independent evaluation of this patient a history and physical is as follows:    ED Course  ED Course as of Aug 22 1543   Thu Aug 12, 2021   1535 Er md note- pa/pomp reviewed by er md             Critical Care Time  Procedures

## 2021-09-09 ENCOUNTER — HOSPITAL ENCOUNTER (OUTPATIENT)
Dept: RADIOLOGY | Facility: CLINIC | Age: 31
Discharge: HOME | End: 2021-09-09
Attending: ANESTHESIOLOGY | Admitting: ANESTHESIOLOGY
Payer: COMMERCIAL

## 2021-09-09 VITALS
SYSTOLIC BLOOD PRESSURE: 116 MMHG | DIASTOLIC BLOOD PRESSURE: 76 MMHG | RESPIRATION RATE: 20 BRPM | HEART RATE: 86 BPM | TEMPERATURE: 97.9 F | OXYGEN SATURATION: 95 %

## 2021-09-09 DIAGNOSIS — M51.16 LUMBAR DISC DISEASE WITH RADICULOPATHY: ICD-10-CM

## 2021-09-09 DIAGNOSIS — M51.16 HERNIATION OF LUMBAR INTERVERTEBRAL DISC WITH RADICULOPATHY: Primary | ICD-10-CM

## 2021-09-09 PROCEDURE — 64484 NJX AA&/STRD TFRM EPI L/S EA: CPT | Performed by: ANESTHESIOLOGY

## 2021-09-09 PROCEDURE — 64483 NJX AA&/STRD TFRM EPI L/S 1: CPT | Performed by: ANESTHESIOLOGY

## 2021-09-09 RX ORDER — BUPIVACAINE HCL/PF 2.5 MG/ML
10 VIAL (ML) INJECTION ONCE
Status: COMPLETED | OUTPATIENT
Start: 2021-09-09 | End: 2021-09-09

## 2021-09-09 RX ORDER — METHYLPREDNISOLONE ACETATE 80 MG/ML
80 INJECTION, SUSPENSION INTRA-ARTICULAR; INTRALESIONAL; INTRAMUSCULAR; PARENTERAL; SOFT TISSUE ONCE
Status: COMPLETED | OUTPATIENT
Start: 2021-09-09 | End: 2021-09-09

## 2021-09-09 RX ORDER — 0.9 % SODIUM CHLORIDE 0.9 %
10 VIAL (ML) INJECTION ONCE
Status: COMPLETED | OUTPATIENT
Start: 2021-09-09 | End: 2021-09-09

## 2021-09-09 RX ADMIN — METHYLPREDNISOLONE ACETATE 80 MG: 80 INJECTION, SUSPENSION INTRA-ARTICULAR; INTRALESIONAL; INTRAMUSCULAR; PARENTERAL; SOFT TISSUE at 14:40

## 2021-09-09 RX ADMIN — BUPIVACAINE HYDROCHLORIDE 2 ML: 2.5 INJECTION, SOLUTION EPIDURAL; INFILTRATION; INTRACAUDAL at 14:40

## 2021-09-09 RX ADMIN — IOHEXOL 1 ML: 300 INJECTION, SOLUTION INTRAVENOUS at 14:40

## 2021-09-09 RX ADMIN — SODIUM CHLORIDE 4 ML: 9 INJECTION, SOLUTION INTRAMUSCULAR; INTRAVENOUS; SUBCUTANEOUS at 14:38

## 2021-09-09 RX ADMIN — Medication 4 ML: at 14:38

## 2021-09-09 NOTE — H&P
History of Present Illness: The patient is a 36 y o  male who presents with complaints of right lower back and leg pain secondary to lumbar disc herniation is here today for right L4-5 transforaminal epidural steroid injection    Patient Active Problem List   Diagnosis    Herniation of lumbar intervertebral disc with radiculopathy       No past medical history on file      Past Surgical History:   Procedure Laterality Date    KNEE ARTHROSCOPY W/ ACL RECONSTRUCTION Right          Current Outpatient Medications:     cyclobenzaprine (FLEXERIL) 10 mg tablet, Take 1 tablet (10 mg total) by mouth daily at bedtime as needed for muscle spasms for up to 10 days, Disp: 10 tablet, Rfl: 0    ibuprofen (MOTRIN) 600 mg tablet, Take 1 tablet (600 mg total) by mouth every 6 (six) hours as needed for mild pain or moderate pain for up to 10 days, Disp: 30 tablet, Rfl: 0    oxyCODONE (ROXICODONE) 5 mg immediate release tablet, Take 1 tablet (5 mg total) by mouth 2 (two) times a day as needed for moderate painMax Daily Amount: 10 mg, Disp: 10 tablet, Rfl: 0    Current Facility-Administered Medications:     bupivacaine (PF) (MARCAINE) 0 25 % injection 10 mL, 10 mL, Epidural, Once, Maryjane Hargrove MD    iohexol (OMNIPAQUE) 300 mg/mL injection 50 mL, 50 mL, Epidural, Once, Maryjane Hargrove MD    lidocaine (PF) (XYLOCAINE-MPF) 2 % injection 5 mL, 5 mL, Infiltration, Once, Maryjane Hargrove MD    methylPREDNISolone acetate (DEPO-MEDROL) injection 80 mg, 80 mg, Epidural, Once, Maryjane Hargrove MD    sodium chloride (PF) 0 9 % injection 10 mL, 10 mL, Infiltration, Once, Maryjane Hargorve MD    No Known Allergies    Physical Exam:   Vitals:    09/09/21 1429   BP: 120/74   Pulse: 94   Resp: 20   Temp: 97 9 °F (36 6 °C)   SpO2: 95%     General: Awake, Alert, Oriented x 3, Mood and affect appropriate  Respiratory: Respirations even and unlabored  Cardiovascular: Peripheral pulses intact; no edema  Musculoskeletal Exam:  Right lower back tenderness    ASA Score: 1    Patient/Chart Verification  Patient ID Verified: Verbal  Consents Confirmed: To be obtained in the Pre-Procedure area  Interval H&P(within 24 hr) Complete (required for Outpatients and Surgery Admit only): To be obtained in the Pre-Procedure area  Allergies Reviewed: Yes  Anticoag/NSAID held?: NA  Currently on antibiotics?: No    Assessment:   1   Lumbar disc disease with radiculopathy        Plan: right L4-L5 TFESI

## 2021-09-09 NOTE — DISCHARGE INSTR - LAB
Epidural Steroid Injection   WHAT YOU NEED TO KNOW:   An epidural steroid injection (ROD) is a procedure to inject steroid medicine into the epidural space  The epidural space is between your spinal cord and vertebrae  Steroids reduce inflammation and fluid buildup in your spine that may be causing pain  You may be given pain medicine along with the steroids  ACTIVITY  · Do not drive or operate machinery today  · No strenuous activity today - bending, lifting, etc   · You may resume normal activites starting tomorrow - start slowly and as tolerated  · You may shower today, but no tub baths or hot tubs  · You may have numbness for several hours from the local anesthetic  Please use caution and common sense, especially with weight-bearing activities  CARE OF THE INJECTION SITE  · If you have soreness or pain, apply ice to the area today (20 minutes on/20 minutes off)  · Starting tomorrow, you may use warm, moist heat or ice if needed  · You may have an increase or change in your discomfort for 36-48 hours after your treatment  · Apply ice and continue with any pain medication you have been prescribed  · Notify [facility] if you have any of the following: redness, drainage, swelling, headache, stiff neck or fever above 100°F     SPECIAL INSTRUCTIONS  · Our office will contact you in approximately 7 days for a progress report  MEDICATIONS  · Continue to take all routine medications  · Our office may have instructed you to hold some medications  As no general anesthesia was used in today's procedure, you should not experience any side effects related to anesthesia  If you have a problem specifically related to your procedure, please call our office at [phone]  Problems not related to your procedure should be directed to your primary care physician

## 2021-09-14 ENCOUNTER — TELEPHONE (OUTPATIENT)
Dept: PAIN MEDICINE | Facility: CLINIC | Age: 31
End: 2021-09-14

## 2021-09-14 DIAGNOSIS — M54.16 LUMBAR RADICULOPATHY: ICD-10-CM

## 2021-09-14 DIAGNOSIS — M51.16 HERNIATION OF LUMBAR INTERVERTEBRAL DISC WITH RADICULOPATHY: Primary | ICD-10-CM

## 2021-09-14 NOTE — TELEPHONE ENCOUNTER
Please have the patient start with physical therapy to get the process started in anticipation of ordering the MRI

## 2021-09-14 NOTE — TELEPHONE ENCOUNTER
Patient states Dr Estrella Washington can't help without the MRI he understands he has to do PT is there anything we can do to start the process of getting MRI approved?     Please advise    Thank you     [phone]

## 2021-09-15 RX ORDER — PREGABALIN 75 MG/1
75 CAPSULE ORAL 2 TIMES DAILY
Qty: 60 CAPSULE | Refills: 1 | Status: SHIPPED | OUTPATIENT
Start: 2021-09-15

## 2021-09-15 NOTE — TELEPHONE ENCOUNTER
RN s/w pt's mom and asked if Julia Grew was avail to speak to? Julia Grew at work but I could try his cell  Pt is s/p Rt L4-L5 TFESI from 9/9/21  RN s/w pt and he reports he had 90% improvement after last Thurs inj until Monday and then the pain returned and as the day went on the pain got progressively worse  He has Rt LB pain and now a burning pain "like it's on fire" of his Rt calf and foot  Pt's pain level is 9/10  Pt said he is taking the oxycodone 5 mg from [facility] sparingly  He takes the oxycodone in the AM, motrin 600 mg in the afternoon and the flexeril at HS  Pt thinks he has 3 oxycodone left, 3 flexeril left and 8 motrin left  Pt needs something to help his pain  Pt said his PT eval is this Friday  Surgeon wouldn't see him without MRI, his Ins Co is requiring PT before allowing MRI  Pls advise  Pt uses [facility] on file

## 2021-09-15 NOTE — TELEPHONE ENCOUNTER
Give steroid more time to work - it should get better  In the meantime, will start lyrica 75mg BID to help with burning nerve pain    E-rx sent Trav

## 2021-09-17 ENCOUNTER — EVALUATION (OUTPATIENT)
Dept: PHYSICAL THERAPY | Facility: CLINIC | Age: 31
End: 2021-09-17
Payer: COMMERCIAL

## 2021-09-17 DIAGNOSIS — M51.16 HERNIATION OF LUMBAR INTERVERTEBRAL DISC WITH RADICULOPATHY: ICD-10-CM

## 2021-09-17 DIAGNOSIS — M54.16 LUMBAR RADICULOPATHY: ICD-10-CM

## 2021-09-17 PROCEDURE — 97162 PT EVAL MOD COMPLEX 30 MIN: CPT | Performed by: PHYSICAL THERAPIST

## 2021-09-17 PROCEDURE — 97140 MANUAL THERAPY 1/> REGIONS: CPT | Performed by: PHYSICAL THERAPIST

## 2021-09-17 PROCEDURE — 97112 NEUROMUSCULAR REEDUCATION: CPT | Performed by: PHYSICAL THERAPIST

## 2021-09-17 NOTE — PROGRESS NOTES
PT Evaluation     Today's date: 2021  Patient name: Tejal Mccullough  : 1990  MRN: [id number]  Referring provider: JOHANNA Wilkinson  Dx:   Encounter Diagnosis     ICD-10-CM    1  Herniation of lumbar intervertebral disc with radiculopathy  M51 16 Ambulatory referral to Physical Therapy   2  Lumbar radiculopathy  M54 16 Ambulatory referral to Physical Therapy       Start Time: 7547  Stop Time: 0825  Total time in clinic (min): 50 minutes    Assessment  Assessment details: Patient is a 36 y o  male who presents to physical therapy with s/s consistent with low back pain with R sided lumbar radiculopathy with significant R sided lateral shift  Patient presents to evaluation with pain, decreased range of motion, decreased strength and decreased tolerance to activity  His high symptom irritability and pain levels resulted in a very limited examination  Session focused on manual and self-correction of lateral shift  I discussed risks, benefits, and alternatives to treatment, and answered all patient questions to patient satisfaction  Patient is an appropriate candidate for skilled PT, and would benefit from skilled PT services to address stated impairments, achieve goals, and to maximize function  Pt is in agreement with this plan   Thank you for the referral     Impairments: abnormal muscle firing, abnormal or restricted ROM, abnormal movement, impaired physical strength, lacks appropriate home exercise program, pain with function, poor posture  and poor body mechanics  Barriers to therapy: Severity of pain levels  Understanding of Dx/Px/POC: good   Prognosis: fair    Goals  (STG) Impairment Goals 4-6 weeks  - Decrease pain to <3/10  - Improve lumbar AROM to >75% throughout  - Increase hip strength to 4+/5 throughout  - Pt will have reduced lateral shift, with proper posture without correction  - Pt will have no radicular symptoms    (LTG) Functional Goals 6-8 weeks  - Return to Prior Level of Function  - Increase Functional Status Measure (FOTO) to predicted outcome  - Patient will be independent with HEP  - Patient will be able to sit without increased pain/compensation/difficulty  - Patient will be able to perform sit to stand without increased pain/compensation/difficulty   - Patient will be able to bend forward without increased pain/compensation/difficulty   - Patient will be able to lift >50 pounds with proper mechanics and no pain  - Patient will return to work and riding his motorcycle      Plan  Patient would benefit from: skilled physical therapy  Planned modality interventions: cryotherapy, TENS and thermotherapy: hydrocollator packs  Planned therapy interventions: abdominal trunk stabilization, activity modification, behavior modification, body mechanics training, flexibility, functional ROM exercises, home exercise program, therapeutic exercise, therapeutic activities, stretching, strengthening, postural training, patient education, neuromuscular re-education, massage, manual therapy and joint mobilization  Frequency: 2x week  Duration in weeks: 8  Treatment plan discussed with: patient        Subjective Evaluation    History of Present Illness  Mechanism of injury: WORK/SCHOOL: Pt works at  [address]: Pt lives by himself  PLOF: He has had a hx of LBP pain since he was 23, with no real KATHARINE  HISTORY OF CURRENT INJURY:  Pt reports that he has been having back pain since he was 23 and that he has received multiple steroid injections which have given him some relief but now he is in a lot of pain more recently  He plans to have surgery but is requiring an MRI before he is able to schedule for surgery so has been sent for PT  He is not hopeful that PT will be able to give him relief  He has been in the ER multiple times with minimal to no relief, aside from Morphine  He has had an MRI in the past which revealed 2 herniated discs at L4-5   More recently pain has gotten worse and he remembers no KATHARINE  He just felt it when he was walking to work  PAIN LOCATION/DESCRIPTORS: Pain, numbness and tingling down his R leg, with a sensation of burning in his calf and hamstring which is constant  AGGRAVATING FACTORS: he is constantly in pain  EASES: changes positions throughout the day, leaning to the R when sitting  DAY PATTERN: none  IMAGING: MRI reveals 2 herniated discs     Ramon Lee denies a new onset of Bladder incontinence, Bowel dysfunction, Recent unexplained weight loss, Constant night pain and History of cancer  PATIENT GOALS: "to get better" and to get the MRI to schedule surgery    Quality of life: good    Pain  Current pain ratin  At best pain ratin  At worst pain rating: 10  Quality: radiating, burning, needle-like, pressure, sharp and squeezing  Relieving factors: medications  Progression: worsening    Treatments  Previous treatment: injection treatment and medication  Patient Goals  Patient goals for therapy: decreased pain and return to work          Objective     Static Posture     Lumbar Spine   Lumbar spine (Right): Shifted        Active Range of Motion     Additional Active Range of Motion Details  LS AROM: unable to assess secondary to pain levels and significant R lateral shift      Strength/Myotome Testing     Additional Strength Details  Unable to assess due to severity of pain    General Comments:      Lumbar Comments  Pt presents with significant R lateral shift, unable to sit or maintain any position aside from standing             Diagnosis: R sided lumbar radiculopathy with R sided lateral shift    Precautions: none   Primary Goals: reduce lateral shift, centralize symptoms, reduce pain   *asterisks by exercise = given for HEP   Manuals        R lateral shift correction LCB                                       There Ex                                                                                Neuro Re-Ed        Self-lateral shift correction 1 5' 5x education Body mechanics and importance of HEP        Re-evaluation              Ther Act                                         Modalities             Tens/heat/ice deferred

## 2021-09-20 NOTE — ED PROCEDURE NOTE
Procedure  Trigger injection 3 or more muscles    Date/Time: 8/12/2021 3:00 PM  Performed by: Parish Riley MD  Authorized by: Parish Riley MD     Patient location:  ED  Consent:     Consent obtained:  Verbal    Consent given by:  Patient    Risks discussed: Allergic reaction, infection, nerve damage, swelling, unsuccessful block, pain and bleeding    Alternatives discussed:  No treatment  Universal protocol:     Procedure explained and questions answered to patient or proxy's satisfaction: yes      Relevant documents present and verified: yes      Test results available and properly labeled: yes      Required blood products, implants, devices, and special equipment available: yes      Site marked: yes      Patient identity confirmed:  Verbally with patient  Location:     Body area:  Lower back    Injection Trigger Points:  3  Pre-procedure details:     Skin preparation:  Chloraprep  Skin anesthesia:     Skin anesthesia method:  Local infiltration    Local anesthetic:  Lidocaine 1% WITH epi and bupivacaine 0 25% w/o epi  Procedure details:     Needle gauge:  27 G    Anesthetic injected:  Bupivacaine 0 25% w/o epi and lidocaine 1% WITH epi    Steroid injected:  None    Additive injected:  None    Injection procedure:  Anatomic landmarks identified, incremental injection, negative aspiration for blood, introduced needle and anatomic landmarks palpated  Post-procedure details:     Dressing: bandaid  Outcome:  Pain improved    Patient tolerance of procedure: Tolerated well, no immediate complications  Comments:      Pt tolerated well and had no complications during or immediately after procedure  Pt reported pain was somewhat improved and ambulation out of ED at d/c monitored closely by self  Pt was able to ambulate well without support s/p procedure at the time of d/c to home    About 8 cc total injected into 3 areas approximately at the level of L3-L4 R paraspinal lumbar area wherein muscle spasms were evident visibly and by tactile evaluation                         Deysi Méndez MD  09/20/21 4967 Matt Anderson MD  09/20/21 9242

## 2021-09-21 ENCOUNTER — OFFICE VISIT (OUTPATIENT)
Dept: PHYSICAL THERAPY | Facility: CLINIC | Age: 31
End: 2021-09-21
Payer: COMMERCIAL

## 2021-09-21 DIAGNOSIS — M51.16 HERNIATION OF LUMBAR INTERVERTEBRAL DISC WITH RADICULOPATHY: Primary | ICD-10-CM

## 2021-09-21 DIAGNOSIS — M54.16 LUMBAR RADICULOPATHY: ICD-10-CM

## 2021-09-21 PROCEDURE — 97112 NEUROMUSCULAR REEDUCATION: CPT | Performed by: PHYSICAL THERAPIST

## 2021-09-21 NOTE — PROGRESS NOTES
Daily Note     Today's date: 2021  Patient name: Keely Foley  : 1990  MRN: [id number]  Referring provider: JOHANNA Garcia  Dx:   Encounter Diagnosis     ICD-10-CM    1  Herniation of lumbar intervertebral disc with radiculopathy  M51 16    2  Lumbar radiculopathy  M54 16        Start Time: 1530  Stop Time: 1615  Total time in clinic (min): 45 minutes    Subjective: Pt reports that he has been feeling better for the past few days and that he was able to work today from 8:30-3 with no real pain  He feels that the steroid injection has kicked in  Reports that he has been doing his HEP over the last few days  Objective: See treatment diary below      Assessment: Tolerated treatment well  Patient exhibited good technique with therapeutic exercises and would benefit from continued PT  Today we focused on correction of his R lateral shift through passive motion at the wall vs manual correction  He arrived less shifted vs last session and in far less pain  He reports doing his HEP but I stressed the importance of this throughout the day, even when he is at work  He had no increase in pain during or post session  We will progress with lateral shift correction and work into extension as tolerated  Plan: Continue per plan of care  Progress treatment as tolerated         Diagnosis: R sided lumbar radiculopathy with R sided lateral shift    Precautions: none   Primary Goals: reduce lateral shift, centralize symptoms, reduce pain   *asterisks by exercise = given for HEP   Manuals       R lateral shift correction LCB                                       There Ex                                                                                Neuro Re-Ed        Self-lateral shift correction 1 5' 5x 1 5' 10x                                                                                      education Body mechanics and importance of HEP        Re-evaluation              Ther Act Modalities             Tens/heat/ice deferred

## 2021-09-29 ENCOUNTER — APPOINTMENT (OUTPATIENT)
Dept: PHYSICAL THERAPY | Facility: CLINIC | Age: 31
End: 2021-09-29
Payer: COMMERCIAL

## 2021-10-04 ENCOUNTER — OFFICE VISIT (OUTPATIENT)
Dept: PHYSICAL THERAPY | Facility: CLINIC | Age: 31
End: 2021-10-04
Payer: COMMERCIAL

## 2021-10-04 DIAGNOSIS — M54.16 LUMBAR RADICULOPATHY: ICD-10-CM

## 2021-10-04 DIAGNOSIS — M51.16 HERNIATION OF LUMBAR INTERVERTEBRAL DISC WITH RADICULOPATHY: Primary | ICD-10-CM

## 2021-10-04 PROCEDURE — 97110 THERAPEUTIC EXERCISES: CPT | Performed by: PHYSICAL THERAPIST

## 2021-10-04 PROCEDURE — 97112 NEUROMUSCULAR REEDUCATION: CPT | Performed by: PHYSICAL THERAPIST

## 2021-10-05 ENCOUNTER — OFFICE VISIT (OUTPATIENT)
Dept: PHYSICAL THERAPY | Facility: CLINIC | Age: 31
End: 2021-10-05
Payer: COMMERCIAL

## 2021-10-05 DIAGNOSIS — M54.16 LUMBAR RADICULOPATHY: ICD-10-CM

## 2021-10-05 DIAGNOSIS — M51.16 HERNIATION OF LUMBAR INTERVERTEBRAL DISC WITH RADICULOPATHY: Primary | ICD-10-CM

## 2021-10-05 PROCEDURE — 97140 MANUAL THERAPY 1/> REGIONS: CPT | Performed by: PHYSICAL THERAPIST

## 2021-10-05 PROCEDURE — 97110 THERAPEUTIC EXERCISES: CPT | Performed by: PHYSICAL THERAPIST

## 2021-10-05 PROCEDURE — 97112 NEUROMUSCULAR REEDUCATION: CPT | Performed by: PHYSICAL THERAPIST

## 2021-10-11 ENCOUNTER — OFFICE VISIT (OUTPATIENT)
Dept: PHYSICAL THERAPY | Facility: CLINIC | Age: 31
End: 2021-10-11
Payer: COMMERCIAL

## 2021-10-11 DIAGNOSIS — M54.16 LUMBAR RADICULOPATHY: ICD-10-CM

## 2021-10-11 DIAGNOSIS — M51.16 HERNIATION OF LUMBAR INTERVERTEBRAL DISC WITH RADICULOPATHY: Primary | ICD-10-CM

## 2021-10-11 PROCEDURE — 97110 THERAPEUTIC EXERCISES: CPT | Performed by: PHYSICAL THERAPIST

## 2021-10-11 PROCEDURE — 97112 NEUROMUSCULAR REEDUCATION: CPT | Performed by: PHYSICAL THERAPIST

## 2021-10-13 ENCOUNTER — OFFICE VISIT (OUTPATIENT)
Dept: PHYSICAL THERAPY | Facility: CLINIC | Age: 31
End: 2021-10-13
Payer: COMMERCIAL

## 2021-10-13 DIAGNOSIS — M54.16 LUMBAR RADICULOPATHY: ICD-10-CM

## 2021-10-13 DIAGNOSIS — M51.16 HERNIATION OF LUMBAR INTERVERTEBRAL DISC WITH RADICULOPATHY: Primary | ICD-10-CM

## 2021-10-13 PROCEDURE — 97110 THERAPEUTIC EXERCISES: CPT | Performed by: PHYSICAL THERAPIST

## 2021-10-13 PROCEDURE — 97530 THERAPEUTIC ACTIVITIES: CPT | Performed by: PHYSICAL THERAPIST

## 2021-10-13 PROCEDURE — 97112 NEUROMUSCULAR REEDUCATION: CPT | Performed by: PHYSICAL THERAPIST

## 2021-10-18 ENCOUNTER — OFFICE VISIT (OUTPATIENT)
Dept: PHYSICAL THERAPY | Facility: CLINIC | Age: 31
End: 2021-10-18
Payer: COMMERCIAL

## 2021-10-18 DIAGNOSIS — M54.16 LUMBAR RADICULOPATHY: ICD-10-CM

## 2021-10-18 DIAGNOSIS — M51.16 HERNIATION OF LUMBAR INTERVERTEBRAL DISC WITH RADICULOPATHY: Primary | ICD-10-CM

## 2021-10-18 PROCEDURE — 97110 THERAPEUTIC EXERCISES: CPT

## 2021-10-18 PROCEDURE — 97530 THERAPEUTIC ACTIVITIES: CPT

## 2021-10-18 PROCEDURE — 97112 NEUROMUSCULAR REEDUCATION: CPT

## 2021-10-20 ENCOUNTER — APPOINTMENT (OUTPATIENT)
Dept: PHYSICAL THERAPY | Facility: CLINIC | Age: 31
End: 2021-10-20
Payer: COMMERCIAL

## 2021-10-25 ENCOUNTER — EVALUATION (OUTPATIENT)
Dept: PHYSICAL THERAPY | Facility: CLINIC | Age: 31
End: 2021-10-25
Payer: COMMERCIAL

## 2021-10-25 DIAGNOSIS — M51.16 HERNIATION OF LUMBAR INTERVERTEBRAL DISC WITH RADICULOPATHY: Primary | ICD-10-CM

## 2021-10-25 DIAGNOSIS — M54.16 LUMBAR RADICULOPATHY: ICD-10-CM

## 2021-10-25 PROCEDURE — 97110 THERAPEUTIC EXERCISES: CPT | Performed by: PHYSICAL THERAPIST

## 2021-10-25 PROCEDURE — 97140 MANUAL THERAPY 1/> REGIONS: CPT | Performed by: PHYSICAL THERAPIST

## 2021-10-25 PROCEDURE — 97112 NEUROMUSCULAR REEDUCATION: CPT | Performed by: PHYSICAL THERAPIST

## 2021-10-27 ENCOUNTER — OFFICE VISIT (OUTPATIENT)
Dept: PHYSICAL THERAPY | Facility: CLINIC | Age: 31
End: 2021-10-27
Payer: COMMERCIAL

## 2021-10-27 DIAGNOSIS — M51.16 HERNIATION OF LUMBAR INTERVERTEBRAL DISC WITH RADICULOPATHY: Primary | ICD-10-CM

## 2021-10-27 DIAGNOSIS — M54.16 LUMBAR RADICULOPATHY: ICD-10-CM

## 2021-10-27 PROCEDURE — 97140 MANUAL THERAPY 1/> REGIONS: CPT | Performed by: PHYSICAL THERAPIST

## 2021-10-27 PROCEDURE — 97110 THERAPEUTIC EXERCISES: CPT | Performed by: PHYSICAL THERAPIST

## 2021-10-27 PROCEDURE — 97112 NEUROMUSCULAR REEDUCATION: CPT | Performed by: PHYSICAL THERAPIST

## 2021-11-01 ENCOUNTER — OFFICE VISIT (OUTPATIENT)
Dept: PHYSICAL THERAPY | Facility: CLINIC | Age: 31
End: 2021-11-01
Payer: COMMERCIAL

## 2021-11-01 DIAGNOSIS — M54.16 LUMBAR RADICULOPATHY: ICD-10-CM

## 2021-11-01 DIAGNOSIS — M51.16 HERNIATION OF LUMBAR INTERVERTEBRAL DISC WITH RADICULOPATHY: Primary | ICD-10-CM

## 2021-11-01 PROCEDURE — 97530 THERAPEUTIC ACTIVITIES: CPT

## 2021-11-01 PROCEDURE — 97112 NEUROMUSCULAR REEDUCATION: CPT

## 2021-11-01 PROCEDURE — 97110 THERAPEUTIC EXERCISES: CPT

## 2021-11-03 ENCOUNTER — OFFICE VISIT (OUTPATIENT)
Dept: PHYSICAL THERAPY | Facility: CLINIC | Age: 31
End: 2021-11-03
Payer: COMMERCIAL

## 2021-11-03 DIAGNOSIS — M54.16 LUMBAR RADICULOPATHY: ICD-10-CM

## 2021-11-03 DIAGNOSIS — M51.16 HERNIATION OF LUMBAR INTERVERTEBRAL DISC WITH RADICULOPATHY: Primary | ICD-10-CM

## 2021-11-03 PROCEDURE — 97110 THERAPEUTIC EXERCISES: CPT | Performed by: PHYSICAL THERAPIST

## 2021-11-03 PROCEDURE — 97112 NEUROMUSCULAR REEDUCATION: CPT | Performed by: PHYSICAL THERAPIST

## 2021-11-10 ENCOUNTER — OFFICE VISIT (OUTPATIENT)
Dept: PHYSICAL THERAPY | Facility: CLINIC | Age: 31
End: 2021-11-10
Payer: COMMERCIAL

## 2021-11-10 DIAGNOSIS — M51.16 HERNIATION OF LUMBAR INTERVERTEBRAL DISC WITH RADICULOPATHY: Primary | ICD-10-CM

## 2021-11-10 DIAGNOSIS — M54.16 LUMBAR RADICULOPATHY: ICD-10-CM

## 2021-11-10 PROCEDURE — 97112 NEUROMUSCULAR REEDUCATION: CPT | Performed by: PHYSICAL THERAPIST

## 2021-11-10 PROCEDURE — 97110 THERAPEUTIC EXERCISES: CPT | Performed by: PHYSICAL THERAPIST

## 2023-08-08 ENCOUNTER — OFFICE VISIT (OUTPATIENT)
Dept: PAIN MEDICINE | Facility: CLINIC | Age: 33
End: 2023-08-08
Payer: COMMERCIAL

## 2023-08-08 VITALS
SYSTOLIC BLOOD PRESSURE: 134 MMHG | BODY MASS INDEX: 28.04 KG/M2 | HEART RATE: 112 BPM | WEIGHT: 207 LBS | DIASTOLIC BLOOD PRESSURE: 93 MMHG | HEIGHT: 72 IN | RESPIRATION RATE: 16 BRPM

## 2023-08-08 DIAGNOSIS — G89.4 CHRONIC PAIN SYNDROME: Primary | ICD-10-CM

## 2023-08-08 DIAGNOSIS — M51.16 HERNIATION OF LUMBAR INTERVERTEBRAL DISC WITH RADICULOPATHY: ICD-10-CM

## 2023-08-08 DIAGNOSIS — M51.16 INTERVERTEBRAL DISC DISORDER WITH RADICULOPATHY OF LUMBAR REGION: ICD-10-CM

## 2023-08-08 PROCEDURE — 99214 OFFICE O/P EST MOD 30 MIN: CPT

## 2023-08-08 RX ORDER — METHYLPREDNISOLONE 4 MG/1
TABLET ORAL
Qty: 1 EACH | Refills: 0 | Status: SHIPPED | OUTPATIENT
Start: 2023-08-08

## 2023-08-08 NOTE — PROGRESS NOTES
Assessment:  1. Chronic pain syndrome    2. Intervertebral disc disorder with radiculopathy of lumbar region    3. Herniation of lumbar intervertebral disc with radiculopathy        Plan:  The patient is a 70-year-old male with a history of chronic pain secondary to low back pain, lumbar intervertebral disc disorder with radiculopathy and lumbar disc herniation who presents to the office with ongoing right-sided low back pain and pain that radiates into the right lower extremity that is unchanged since his last office visit. At this time, I instructed patient we can repeat the right-sided L4-L5 TFESI to decrease inflammation and provide him relief. Patient would like to proceed. I instructed our  will schedule him. Complete risks and benefits including bleeding, infection, tissue reaction, nerve injury and allergic reaction were discussed. The approach was demonstrated using models and literature was provided. Verbal and written consent was obtained. For now, to help with his pain, I will provide him with a Medrol Dosepak to complete. I instructed patient to follow directions on the package and avoid NSAIDs. My impressions and treatment recommendations were discussed in detail with the patient who verbalized understanding and had no further questions. Discharge instructions were provided. I personally saw and examined the patient and I agree with the above discussed plan of care. Orders Placed This Encounter   Procedures   • [place] spine and pain procedure     Dr Merari Rainey     Standing Status:   Future     Standing Expiration Date:   8/8/2027     Order Specific Question:   Reason for Exam:     Answer:   Right L4-L5 TFESI     Order Specific Question:   Anticoagulant hold needed?      Answer:   No     New Medications Ordered This Visit   Medications   • methylPREDNISolone 4 MG tablet therapy pack     Sig: Use as directed on package     Dispense:  1 each     Refill:  0       History of Present Illness:  Shirley Lesches is a 34 y.o. male with a history of chronic pain secondary to low back pain, lumbar intervertebral disc disorder with radiculopathy and lumbar disc herniation. He was last seen on 9/9/2021 where he underwent a right-sided L4-5 TFESI which provided him greater than 50% relief of his pain. He presents to the office with ongoing right-sided low back pain and pain that radiates into the right lower extremity. He states his pain is the same since the last office visit and constant but worse in the morning and at night. He rates the quality of his pain is burning, sharp, throbbing, numbness and is currently rating his pain a 7/10 on a numeric scale. Current pain medications include Advil as needed which is providing mild relief. I have personally reviewed and/or updated the patient's past medical history, past surgical history, family history, social history, current medications, allergies, and vital signs today. Review of Systems   Respiratory: Negative for shortness of breath. Cardiovascular: Negative for chest pain. Gastrointestinal: Negative for constipation, diarrhea, nausea and vomiting. Musculoskeletal: Positive for back pain and gait problem. Negative for arthralgias, joint swelling and myalgias. RLE Pain   Skin: Negative for rash. Neurological: Positive for weakness. Negative for dizziness and seizures. All other systems reviewed and are negative. Patient Active Problem List   Diagnosis   • Herniation of lumbar intervertebral disc with radiculopathy       History reviewed. No pertinent past medical history. Past Surgical History:   Procedure Laterality Date   • KNEE ARTHROSCOPY W/ ACL RECONSTRUCTION Right        History reviewed. No pertinent family history.     Social History     Occupational History   • Not on file   Tobacco Use   • Smoking status: Every Day     Packs/day: 1.00     Years: 10.00     Total pack years: 10.00     Types: Cigarettes • Smokeless tobacco: Never   Vaping Use   • Vaping Use: Never used   Substance and Sexual Activity   • Alcohol use: Yes     Comment: occ   • Drug use: Yes     Types: Marijuana   • Sexual activity: Not on file       Current Outpatient Medications on File Prior to Visit   Medication Sig   • cyclobenzaprine (FLEXERIL) 10 mg tablet Take 1 tablet (10 mg total) by mouth daily at bedtime as needed for muscle spasms for up to 10 days (Patient not taking: Reported on 8/8/2023)   • ibuprofen (MOTRIN) 600 mg tablet Take 1 tablet (600 mg total) by mouth every 6 (six) hours as needed for mild pain or moderate pain for up to 10 days (Patient not taking: Reported on 8/8/2023)   • oxyCODONE (ROXICODONE) 5 mg immediate release tablet Take 1 tablet (5 mg total) by mouth 2 (two) times a day as needed for moderate painMax Daily Amount: 10 mg (Patient not taking: Reported on 8/8/2023)   • pregabalin (LYRICA) 75 mg capsule Take 1 capsule (75 mg total) by mouth 2 (two) times a day (Patient not taking: Reported on 8/8/2023)     No current facility-administered medications on file prior to visit. No Known Allergies    Physical Exam:    /93   Pulse (!) 112   Resp 16   Ht 6' (1.829 m)   Wt 93.9 kg (207 lb)   BMI 28.07 kg/m²     Constitutional:normal, well developed, well nourished, alert, in no distress and non-toxic and no overt pain behavior.   Eyes:anicteric  HEENT:grossly intact  Neck:supple, symmetric, trachea midline and no masses   Pulmonary:even and unlabored  Cardiovascular:No edema or pitting edema present  Skin:Normal without rashes or lesions and well hydrated  Psychiatric:Mood and affect appropriate  Neurologic:Cranial Nerves II-XII grossly intact  Musculoskeletal:antalgic     Lumbar Spine Exam    Appearance:  Normal lordosis  Palpation/Tenderness:  right lumbar paraspinal tenderness  Sensory:  no sensory deficits noted  Range of Motion:  Flexion:  Minimally limited  with pain  Extension:  Minimally limited with pain  Motor Strength:  Left hip flexion:  5/5  Right hip flexion:  5/5  Left knee flexion:  5/5  Left knee extension:  5/5  Right knee flexion:  5/5  Right knee extension:  5/5  Left foot dorsiflexion:  5/5  Left foot plantar flexion:  5/5  Right foot dorsiflexion:  5/5  Right foot plantar flexion:  5/5      Imaging

## 2023-08-31 ENCOUNTER — TELEPHONE (OUTPATIENT)
Age: 33
End: 2023-08-31

## 2023-08-31 NOTE — TELEPHONE ENCOUNTER
Caller: patient    Doctor: ANTHONY    Reason for cancel  today procedure, feeling sick.  Will call back to reschedule    Call back#: